# Patient Record
Sex: MALE | Race: WHITE | Employment: OTHER | URBAN - METROPOLITAN AREA
[De-identification: names, ages, dates, MRNs, and addresses within clinical notes are randomized per-mention and may not be internally consistent; named-entity substitution may affect disease eponyms.]

---

## 2017-01-26 ENCOUNTER — HOSPITAL ENCOUNTER (OUTPATIENT)
Dept: LAB | Age: 82
Discharge: HOME | End: 2017-01-26
Payer: MEDICARE

## 2017-01-26 ENCOUNTER — OFFICE VISIT (OUTPATIENT)
Dept: INTERNAL MEDICINE CLINIC | Age: 82
End: 2017-01-26

## 2017-01-26 VITALS
HEIGHT: 70 IN | WEIGHT: 150 LBS | BODY MASS INDEX: 21.47 KG/M2 | OXYGEN SATURATION: 98 % | TEMPERATURE: 97.5 F | DIASTOLIC BLOOD PRESSURE: 64 MMHG | HEART RATE: 83 BPM | SYSTOLIC BLOOD PRESSURE: 138 MMHG | RESPIRATION RATE: 16 BRPM

## 2017-01-26 DIAGNOSIS — R41.3 MEMORY LOSS: Primary | ICD-10-CM

## 2017-01-26 DIAGNOSIS — R41.81 AGE-RELATED COGNITIVE DECLINE: ICD-10-CM

## 2017-01-26 DIAGNOSIS — G47.00 INSOMNIA, UNSPECIFIED TYPE: ICD-10-CM

## 2017-01-26 PROCEDURE — 36415 COLL VENOUS BLD VENIPUNCTURE: CPT

## 2017-01-26 PROCEDURE — 83921 ORGANIC ACID SINGLE QUANT: CPT

## 2017-01-26 PROCEDURE — 82607 VITAMIN B-12: CPT

## 2017-01-26 PROCEDURE — 84443 ASSAY THYROID STIM HORMONE: CPT

## 2017-01-26 RX ORDER — TRAZODONE HYDROCHLORIDE 50 MG/1
50 TABLET ORAL
Qty: 90 TAB | Refills: 1
Start: 2017-01-26 | End: 2020-05-25

## 2017-01-26 RX ORDER — TAMSULOSIN HYDROCHLORIDE 0.4 MG/1
CAPSULE ORAL
Refills: 5 | COMMUNITY
Start: 2016-12-29 | End: 2017-11-08 | Stop reason: ALTCHOICE

## 2017-01-26 RX ORDER — SIMETHICONE 180 MG
1 CAPSULE ORAL
COMMUNITY
End: 2020-02-19 | Stop reason: ALTCHOICE

## 2017-01-26 RX ORDER — RANITIDINE 150 MG/1
150 TABLET, FILM COATED ORAL DAILY
COMMUNITY
End: 2018-02-01

## 2017-01-26 NOTE — MR AVS SNAPSHOT
Visit Information Date & Time Provider Department Dept. Phone Encounter #  
 1/26/2017 10:00 AM Gentry Winchester MD [facility] [phone] [id number] Upcoming Health Maintenance Date Due DTaP/Tdap/Td series (1 - Tdap) 1/2/2011 Pneumococcal 65+ High/Highest Risk (2 of 2 - PPSV23) 1/1/2016 MEDICARE YEARLY EXAM 3/3/2017 GLAUCOMA SCREENING Q2Y 9/1/2017 Allergies as of 1/26/2017  Review Complete On: 1/26/2017 By: Neptali Wei LPN Severity Noted Reaction Type Reactions Statins-hmg-coa Reductase Inhibitors  08/31/2015    Myalgia Current Immunizations  Reviewed on 1/26/2017 Name Date Influenza High Dose Vaccine PF 11/3/2016, 11/19/2015 Pneumococcal Vaccine (Unspecified Type) 1/1/2011 Td 1/1/2011 Zoster Vaccine, Live 1/1/2011 Reviewed by Neptali Wei LPN on 1/06/3030 at 84:80 AM  
 Reviewed by Neptali Wei LPN on 3/75/8191 at 04:98 AM  
You Were Diagnosed With   
  
 Codes Comments Memory loss    -  Primary ICD-10-CM: R41.3 ICD-9-CM: 780.93 Insomnia, unspecified type     ICD-10-CM: G47.00 ICD-9-CM: 780.52 Age-related cognitive decline     ICD-10-CM: R41.81 
ICD-9-CM: 294.9 Vitals BP Pulse Temp Resp Height(growth percentile) Weight(growth percentile)  
 138/64 (BP 1 Location: Left arm, BP Patient Position: Sitting) 83 97.5 °F (36.4 °C) (Oral) 16 5' 10\" (1.778 m) 150 lb (68 kg) SpO2 BMI Smoking Status 98% 21.52 kg/m2 Former Smoker Vitals History BMI and BSA Data Body Mass Index Body Surface Area  
 21.52 kg/m 2 1.83 m 2 Preferred Pharmacy Pharmacy Name Phone [id number] [address] [address] [phone] Your Updated Medication List  
  
   
This list is accurate as of: 1/26/17 10:39 AM.  Always use your most recent med list.  
  
  
  
  
 calcium citrate-vitamin D3 tablet Commonly known as:  CITRACAL WITH VITAMIN D MAXIMUM Take  by mouth two (2) times a day. * lisinopril 10 mg tablet Commonly known as:  Wilson Kelley Take 1 Tab by mouth daily. * lisinopril 10 mg tablet Commonly known as:  PRINIVIL, ZESTRIL  
TAKE 1 TABLET BY MOUTH EVERY DAY  
  
 raNITIdine 150 mg tablet Commonly known as:  ZANTAC Take 150 mg by mouth daily. simethicone 180 mg Cap Take 1 Tab by mouth daily. tamsulosin 0.4 mg capsule Commonly known as:  FLOMAX TAKE ONE CAPSULE BY MOUTH EVERY DAY  
  
 traZODone 50 mg tablet Commonly known as:  Rebbecca Bunde Take 1 Tab by mouth nightly. * Notice: This list has 2 medication(s) that are the same as other medications prescribed for you. Read the directions carefully, and ask your doctor or other care provider to review them with you. We Performed the Following METHYLMALONIC ACID [32281 CPT(R)] TSH 3RD GENERATION [94143 CPT(R)] VITAMIN B12 & FOLATE [81588 CPT(R)] To-Do List   
 01/26/2017 Imaging:  CT HEAD WO CONT Introducing [facility]! Gagan Xie introduces [employer] patient portal. Now you can access parts of your medical record, email your doctor's office, and request medication refills online. 1. In your internet browser, go to [email or web address] 2. Click on the First Time User? Click Here link in the Sign In box. You will see the New Member Sign Up page. 3. Enter your [employer] Access Code exactly as it appears below. You will not need to use this code after youve completed the sign-up process. If you do not sign up before the expiration date, you must request a new code. · [employer] Access Code: [email or web address] Expires: 4/26/2017 10:39 AM 
 
4. Enter the last four digits of your Social Security Number (xxxx) and Date of Birth (mm/dd/yyyy) as indicated and click Submit. You will be taken to the next sign-up page. 5. Create a [employer] ID. This will be your [employer] login ID and cannot be changed, so think of one that is secure and easy to remember. 6. Create a [employer] password. You can change your password at any time. 7. Enter your Password Reset Question and Answer. This can be used at a later time if you forget your password. 8. Enter your e-mail address. You will receive e-mail notification when new information is available in [address] 9. Click Sign Up. You can now view and download portions of your medical record. 10. Click the Download Summary menu link to download a portable copy of your medical information. If you have questions, please visit the Frequently Asked Questions section of the [employer] website. Remember, [employer] is NOT to be used for urgent needs. For medical emergencies, dial 911. Now available from your iPhone and Android! Please provide this summary of care documentation to your next provider. Your primary care clinician is listed as Rex Tavarez. If you have any questions after today's visit, please call [phone].

## 2017-01-26 NOTE — PROGRESS NOTES
Chief Complaint   Patient presents with    Medication Evaluation     Elavil    Memory Loss     Cognitive decline  Subjective:   Ever Farris is a 80 y.o.  right handed male self-referred for evaluation and treatment of cognitive problems. He is accompanied by patient and daughter. Primary caregiver is patient. The family and the patient identify problems with changes in short term memory and getting disoriented outside of familiar environment. Family and patient report problems with no agitation. Family and patient are concerned about  medication errors. Medication administration: patient self medicates      Functional Assessment:   Activities of Daily Living (ADLs):    He is independent in the following: ambulation, bathing and hygiene, feeding, continence, toileting and dressing  Requires assistance with the following: grooming  Cues to do it  Outside reports reviewed: none. Patient Active Problem List    Diagnosis Date Noted    Cognitive decline 09/02/2016    Urethral stricture 03/02/2016    Essential hypertension 03/02/2016    Insomnia 03/02/2016    Statin intolerance 02/11/2016    Prostate CA ([address] 08/31/2015    CAD (coronary artery disease) 08/31/2015    Tinnitus 08/31/2015     Current Outpatient Prescriptions   Medication Sig Dispense Refill    tamsulosin (FLOMAX) 0.4 mg capsule TAKE ONE CAPSULE BY MOUTH EVERY DAY  5    raNITIdine (ZANTAC) 150 mg tablet Take 150 mg by mouth daily.  simethicone 180 mg cap Take 1 Tab by mouth daily.  traZODone (DESYREL) 50 mg tablet Take 1 Tab by mouth nightly. 90 Tab 1    calcium citrate-vitamin D3 (CITRACAL WITH VITAMIN D MAXIMUM) tablet Take  by mouth two (2) times a day.  lisinopril (PRINIVIL, ZESTRIL) 10 mg tablet Take 1 Tab by mouth daily.  90 Tab 1    lisinopril (PRINIVIL, ZESTRIL) 10 mg tablet TAKE 1 TABLET BY MOUTH EVERY DAY 30 Tab 5        Review of Systems  A comprehensive review of systems was negative except for that written in the HPI. Objective:     Visit Vitals    /64 (BP 1 Location: Left arm, BP Patient Position: Sitting)    Pulse 83    Temp 97.5 °F (36.4 °C) (Oral)    Resp 16    Ht 5' 10\" (1.778 m)    Wt 150 lb (68 kg)    SpO2 98%    BMI 21.52 kg/m2     Visit Vitals    /64 (BP 1 Location: Left arm, BP Patient Position: Sitting)    Pulse 83    Temp 97.5 °F (36.4 °C) (Oral)    Resp 16    Ht 5' 10\" (1.778 m)    Wt 150 lb (68 kg)    SpO2 98%    BMI 21.52 kg/m2     General appearance: alert, cooperative, no distress, appears stated age  Neurologic: Alert and oriented X 3, normal strength and tone. Normal symmetric reflexes. Normal coordination and gait    MMSE: was not done time restraints    Imaging      Lab Review       Assessment/Plan:     Dementia, or cognitive declinbe, type etiology unclear  Nola Dominique was seen today for medication evaluation and memory loss. Diagnoses and all orders for this visit:    Memory loss  -     CT HEAD WO CONT; Future  -     VITAMIN B12 & FOLATE  -     METHYLMALONIC ACID  -     TSH 3RD GENERATION    Insomnia, unspecified type  -     traZODone (DESYREL) 50 mg tablet; Take 1 Tab by mouth nightly.     Age-related cognitive decline  -     CT HEAD WO CONT; Future  -     VITAMIN B12 & FOLATE  -     METHYLMALONIC ACID  -     TSH 3RD GENERATION      Counseled patient and fmaily regarding the diagnosis and progression of dementia  Provided written information about dementia and community resources     [id number] ent note meds [id number]

## 2017-01-26 NOTE — LETTER
1/31/2017 2:37 PM 
 
[place]. Natali Cota [address] [facility] [place]: 
 
Please find your most recent results below. Resulted Orders VITAMIN B12 & FOLATE Result Value Ref Range Vitamin B12 >2000 (H) 211 - 946 pg/mL Folate 19.8 >3.0 ng/mL Comment: A serum folate concentration of less than 3.1 ng/mL is 
considered to represent clinical deficiency. Narrative Performed at:  [address]  [id number] : Lyssa Clark MD, Phone:  [phone] METHYLMALONIC ACID Result Value Ref Range METHYLMALONIC ACID, SERUM 163 0 - 378 nmol/L Narrative Performed at:  [address]  [id number] : Lyssa Clark MD, Phone:  [phone] TSH 3RD GENERATION Result Value Ref Range TSH 1.470 0.450 - 4.500 uIU/mL Narrative Performed at:  [address]  [id number] : Lyssa Clark MD, Phone:  [phone] RECOMMENDATIONS: 
I have reviewed all lab results which are normal or stable. Please call me if you have any questions: [phone] Sincerely, Zaida Jaimes MD

## 2017-01-30 LAB
FOLATE SERPL-MCNC: 19.8 NG/ML
METHYLMALONATE SERPL-SCNC: 163 NMOL/L (ref 0–378)
TSH SERPL DL<=0.005 MIU/L-ACNC: 1.47 UIU/ML (ref 0.45–4.5)
VIT B12 SERPL-MCNC: >2000 PG/ML (ref 211–946)

## 2017-01-31 NOTE — PROGRESS NOTES
Letter sent to the address on file to notify the patient per Dr Kenneth Torres that the labs are normal/stable. Asked for a return call to the office with any additional questions.

## 2017-02-02 ENCOUNTER — HOSPITAL ENCOUNTER (OUTPATIENT)
Dept: CT IMAGING | Age: 82
Discharge: HOME | End: 2017-02-02
Attending: INTERNAL MEDICINE
Payer: MEDICARE

## 2017-02-02 DIAGNOSIS — R41.81 AGE-RELATED COGNITIVE DECLINE: ICD-10-CM

## 2017-02-02 DIAGNOSIS — R41.3 MEMORY LOSS: ICD-10-CM

## 2017-02-02 PROCEDURE — 70450 CT HEAD/BRAIN W/O DYE: CPT

## 2017-02-03 NOTE — PROGRESS NOTES
Writer spoke with patient and informed him Per Dr Acacia Henson, nothing acute seen on CT scan possible old stroke seen.  Patient expressed verbal understanding

## 2017-02-13 ENCOUNTER — OFFICE VISIT (OUTPATIENT)
Dept: INTERNAL MEDICINE CLINIC | Age: 82
End: 2017-02-13

## 2017-02-13 VITALS
OXYGEN SATURATION: 98 % | SYSTOLIC BLOOD PRESSURE: 124 MMHG | DIASTOLIC BLOOD PRESSURE: 60 MMHG | WEIGHT: 149 LBS | BODY MASS INDEX: 21.33 KG/M2 | HEIGHT: 70 IN | RESPIRATION RATE: 16 BRPM | HEART RATE: 74 BPM

## 2017-02-13 DIAGNOSIS — R41.89 COGNITIVE DECLINE: Primary | ICD-10-CM

## 2017-02-13 DIAGNOSIS — I69.319 CVA, OLD, COGNITIVE DEFICITS: ICD-10-CM

## 2017-02-13 RX ORDER — EZETIMIBE 10 MG
TABLET ORAL
Refills: 6 | COMMUNITY
Start: 2017-02-03 | End: 2018-02-01

## 2017-02-13 RX ORDER — DONEPEZIL HYDROCHLORIDE 10 MG/1
10 TABLET, FILM COATED ORAL
Qty: 30 TAB | Refills: 5 | Status: SHIPPED | OUTPATIENT
Start: 2017-03-13 | End: 2017-11-08 | Stop reason: ALTCHOICE

## 2017-02-13 RX ORDER — LISINOPRIL 20 MG/1
TABLET ORAL
Refills: 3 | COMMUNITY
Start: 2017-02-03 | End: 2018-02-01

## 2017-02-13 RX ORDER — DONEPEZIL HYDROCHLORIDE 5 MG/1
5 TABLET, FILM COATED ORAL
Qty: 30 TAB | Refills: 0 | Status: SHIPPED | OUTPATIENT
Start: 2017-02-13 | End: 2017-11-08 | Stop reason: ALTCHOICE

## 2017-02-13 NOTE — PROGRESS NOTES
Chief Complaint   Patient presents with    Follow-up     discuss test and labs      Card restarted zetia as intol of statins  I agree    Gassy distension  Chief Complaint   Patient presents with    Follow-up     discuss test and labs      Cognitive decline  Subjective:   Mervin Paz is a 80 y.o.  right handed male self-referred for evaluation and treatment of cognitive problems. He is accompanied by patient and daughter. Primary caregiver is patient. The family and the patient identify problems with changes in short term memory and getting disoriented outside of familiar environment. Family and patient report problems with no agitation. Family and patient are concerned about  medication errors. Medication administration: patient self medicates      Functional Assessment:   Activities of Daily Living (ADLs):    He is independent in the following: ambulation, bathing and hygiene, feeding, continence, toileting and dressing  Requires assistance with the following: grooming  Cues to do it  Outside reports reviewed: none. 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  simethicone 180 mg cap Take 1 Tab by mouth daily.  traZODone (DESYREL) 50 mg tablet Take 1 Tab by mouth nightly.  90 Tab 1    lisinopril (PRINIVIL, ZESTRIL) 10 mg tablet TAKE 1 TABLET BY MOUTH EVERY DAY 30 Tab 5    calcium citrate-vitamin D3 (CITRACAL WITH VITAMIN D MAXIMUM) tablet Take  by mouth two (2) times a day.  lisinopril (PRINIVIL, ZESTRIL) 20 mg tablet TAKE 1 TABLET BY ORAL ROUTE EVERY DAY  3        Review of Systems  A comprehensive review of systems was negative except for that written in the HPI. Gassy bloating and rectal gas  Objective:     Visit Vitals    /60    Pulse 74    Resp 16    Ht 5' 10\" (1.778 m)    Wt 149 lb (67.6 kg)    SpO2 98%    BMI 21.38 kg/m2     Visit Vitals    /60    Pulse 74    Resp 16    Ht 5' 10\" (1.778 m)    Wt 149 lb (67.6 kg)    SpO2 98%    BMI 21.38 kg/m2     General appearance: alert, cooperative, no distress, appears stated age  Neurologic: Alert and oriented X 3, normal strength and tone. Normal symmetric reflexes. Normal coordination and gait    MMSE: was not done time restraints    Imaging  CT Results (most recent):    Results from Hospital Encounter encounter on 02/02/17   CT HEAD WO CONT   Narrative EXAM:  CT HEAD WO CONT    INDICATION:   Abnormal gait (ataxia)    COMPARISON: None. TECHNIQUE: Unenhanced CT of the head was performed using 5 mm images. Brain and  bone windows were generated. CT dose reduction was achieved through use of a  standardized protocol tailored for this examination and automatic exposure  control for dose modulation. FINDINGS:  The ventricles and sulci are normal in size, shape and configuration and  midline. Minimal small vessel ischemic changes are seen in the periventricular  white matter. Focus of chronic ischemia is noted in the right basal ganglia. There is no intracranial hemorrhage, extra-axial collection, mass, mass effect  or midline shift. The basilar cisterns are open. No acute infarct is  identified. The bone windows demonstrate no abnormalities. Mucoperiosteal  thickening is noted in the left maxillary sinus. Vascular calcification is  noted. Impression IMPRESSION: No acute abnormality.  Minimal small vessel ischemic changes. Focus  of chronic ischemia right basal ganglia. Lab Review     Lab Results   Component Value Date/Time    Vitamin B12 >2000 01/26/2017 10:49 AM    Folate 19.8 01/26/2017 10:49 AM       Assessment/Plan:     Dementia, or cognitive declinbe, type etiology unclear  There are no diagnoses linked to this encounter. Counseled patient and fmaily regarding the diagnosis and progression of dementia  Provided written information about dementia and community resources     [id number] ent note meds reviewed    Lab Results   Component Value Date/Time    Vitamin B12 >2000 01/26/2017 10:49 AM    Folate 19.8 01/26/2017 10:49 AM   Darby Jose was seen today for follow-up. Diagnoses and all orders for this visit:    Cognitive decline    CVA, old, cognitive deficits    Other orders  -     donepezil (ARICEPT) 5 mg tablet; Take 1 Tab by mouth nightly. -     donepezil (ARICEPT) 10 mg tablet; Take 1 Tab by mouth nightly. -     pneumococcal 13 tyrone conj dip (PREVNAR-13) 0.5 mL syrg injection; 0.5 mL by IntraMUSCular route once for 1 dose.       Will try aricept titration  Side effects reviewed

## 2017-02-21 ENCOUNTER — TELEPHONE (OUTPATIENT)
Dept: INTERNAL MEDICINE CLINIC | Age: 82
End: 2017-02-21

## 2017-02-21 NOTE — TELEPHONE ENCOUNTER
Derek Nowak eye doctor prescribed Gabapentin 100mg to him and he would like to know if it's okay to refill it.   Please call

## 2017-02-22 NOTE — TELEPHONE ENCOUNTER
Notified the patient per Dr Rudy Lancaster that it is fine to take Gabapentin. He states understanding.

## 2017-02-27 RX ORDER — LISINOPRIL 10 MG/1
TABLET ORAL
Qty: 30 TAB | Refills: 10 | Status: SHIPPED | OUTPATIENT
Start: 2017-02-27 | End: 2017-11-08 | Stop reason: SDUPTHER

## 2017-03-05 RX ORDER — TRAZODONE HYDROCHLORIDE 50 MG/1
TABLET ORAL
Qty: 90 TAB | Refills: 1 | Status: SHIPPED | OUTPATIENT
Start: 2017-03-05 | End: 2017-11-08 | Stop reason: SDUPTHER

## 2017-03-15 RX ORDER — DONEPEZIL HYDROCHLORIDE 5 MG/1
TABLET, FILM COATED ORAL
Qty: 30 TAB | Refills: 0 | OUTPATIENT
Start: 2017-03-15

## 2017-03-15 NOTE — TELEPHONE ENCOUNTER
Spoke with the pharmacist and she states understanding to fill the Aricept 10 mg that they have on fill.

## 2017-03-27 RX ORDER — GUAIFENESIN 100 MG/5ML
81 LIQUID (ML) ORAL DAILY
Qty: 30 TAB | Status: SHIPPED | OUTPATIENT
Start: 2017-03-27 | End: 2018-02-01

## 2017-07-21 ENCOUNTER — TELEPHONE (OUTPATIENT)
Dept: INTERNAL MEDICINE CLINIC | Age: 82
End: 2017-07-21

## 2017-07-21 ENCOUNTER — OFFICE VISIT (OUTPATIENT)
Dept: INTERNAL MEDICINE CLINIC | Age: 82
End: 2017-07-21

## 2017-07-21 VITALS
DIASTOLIC BLOOD PRESSURE: 63 MMHG | SYSTOLIC BLOOD PRESSURE: 130 MMHG | RESPIRATION RATE: 15 BRPM | BODY MASS INDEX: 20.62 KG/M2 | HEART RATE: 67 BPM | OXYGEN SATURATION: 98 % | HEIGHT: 70 IN | WEIGHT: 144 LBS | TEMPERATURE: 97.9 F

## 2017-07-21 DIAGNOSIS — J02.9 ACUTE PHARYNGITIS, UNSPECIFIED ETIOLOGY: Primary | ICD-10-CM

## 2017-07-21 DIAGNOSIS — Z00.00 ROUTINE GENERAL MEDICAL EXAMINATION AT A HEALTH CARE FACILITY: ICD-10-CM

## 2017-07-21 PROBLEM — Z71.89 ACP (ADVANCE CARE PLANNING): Status: ACTIVE | Noted: 2017-07-21

## 2017-07-21 NOTE — PROGRESS NOTES
Chief Complaint   Patient presents with    Sore Throat    Ear Pain     Seeing the eye doctor for conjunctivitis  One day sore throat radiating to ear feels better carola  Chronic post nasal drip  No cough or fever      Vitals:    07/21/17 0825   BP: 130/63   Pulse: 67   Resp: 15   Temp: 97.9 °F (36.6 °C)   TempSrc: Oral   SpO2: 98%   Weight: 144 lb (65.3 kg)   Height: 5' 10\" (1.778 m)     Anxious  . Throat exam normal. Oral cavity, tongue, pharynx and palate have no inflammation or suspicious lesions. Tonsils - tonsils are present and normal. Teeth normal without tenderness. Ears ok  Chest clear    Aishwarya Troy was seen today for sore throat and ear pain. Diagnoses and all orders for this visit:    Acute pharyngitis, unspecified etiology      The patient is reassured that these symptoms do not appear to represent a serious or threatening condition.       Gargle and lozenge advised    Wellness exam by nursing reviewed and agree with plans  discussed with nurse

## 2017-07-21 NOTE — MR AVS SNAPSHOT
Visit Information Date & Time Provider Department Dept. Phone Encounter #  
 7/21/2017  8:30 AM Rene Wen MD On license of [facility] [phone] [id number] Upcoming Health Maintenance Date Due DTaP/Tdap/Td series (1 - Tdap) 1/2/2011 Pneumococcal 65+ High/Highest Risk (2 of 2 - PPSV23) 1/1/2016 MEDICARE YEARLY EXAM 3/3/2017 INFLUENZA AGE 9 TO ADULT 8/1/2017 GLAUCOMA SCREENING Q2Y 2/16/2019 Allergies as of 7/21/2017  Review Complete On: 7/21/2017 By: Rachel Arndt LPN Severity Noted Reaction Type Reactions Statins-hmg-coa Reductase Inhibitors  08/31/2015    Myalgia Current Immunizations  Reviewed on 1/26/2017 Name Date Influenza High Dose Vaccine PF 11/3/2016, 11/19/2015 Pneumococcal Vaccine (Unspecified Type) 1/1/2011 Td 1/1/2011 Zoster Vaccine, Live 1/1/2011 Not reviewed this visit Vitals BP Pulse Temp Resp Height(growth percentile) Weight(growth percentile) 130/63 (BP 1 Location: Left arm, BP Patient Position: Sitting) 67 97.9 °F (36.6 °C) (Oral) 15 5' 10\" (1.778 m) 144 lb (65.3 kg) SpO2 BMI Smoking Status 98% 20.66 kg/m2 Former Smoker Vitals History BMI and BSA Data Body Mass Index Body Surface Area  
 20.66 kg/m 2 1.8 m 2 Preferred Pharmacy Pharmacy Name Phone [id number] [phone] Your Updated Medication List  
  
   
This list is accurate as of: 7/21/17  8:55 AM.  Always use your most recent med list.  
  
  
  
  
 aspirin 81 mg chewable tablet Take 1 Tab by mouth daily. calcium citrate-vitamin D3 tablet Commonly known as:  CITRACAL WITH VITAMIN D MAXIMUM Take  by mouth two (2) times a day. * donepezil 5 mg tablet Commonly known as:  ARICEPT Take 1 Tab by mouth nightly. * donepezil 10 mg tablet Commonly known as:  ARICEPT  
 Take 1 Tab by mouth nightly. * lisinopril 10 mg tablet Commonly known as:  PRINIVIL, ZESTRIL  
TAKE 1 TABLET BY MOUTH EVERY DAY  
  
 * lisinopril 20 mg tablet Commonly known as:  PRINIVIL, ZESTRIL  
TAKE 1 TABLET BY ORAL ROUTE EVERY DAY  
  
 * lisinopril 10 mg tablet Commonly known as:  PRINIVIL, ZESTRIL  
TAKE 1 TABLET BY MOUTH EVERY DAY  
  
 raNITIdine 150 mg tablet Commonly known as:  ZANTAC Take 150 mg by mouth daily. simethicone 180 mg Cap Take 1 Tab by mouth daily. tamsulosin 0.4 mg capsule Commonly known as:  FLOMAX TAKE ONE CAPSULE BY MOUTH EVERY DAY  
  
 * traZODone 50 mg tablet Commonly known as:  Marianna Deeds Take 1 Tab by mouth nightly. * traZODone 50 mg tablet Commonly known as:  DESYREL  
TAKE 1 TAB BY MOUTH NIGHTLY. ZETIA 10 mg tablet Generic drug:  ezetimibe TAKE 1 TABLET BY ORAL ROUTE EVERY DAY  
  
 * Notice: This list has 7 medication(s) that are the same as other medications prescribed for you. Read the directions carefully, and ask your doctor or other care provider to review them with you. Patient Instructions Medicare Part B Preventive Services Limitations Recommendation Scheduled Glaucoma Screening  Covered for patients with diagnosis of diabetes or family history of glaucoma; -Americans age 48 and older; -Americans age 72 and older Completed 2/16/2017 Recommended every 1-2 years Due 
2/2018 Colorectal Cancer Screening 
 
-Fecal occult blood test every year OR 
-Flexible sigmoidoscopy every 5 yrs OR 
-Colonoscopy every 10 years OR 
-Barium Enema Age 54-65; After age 76 if history of abnormal results Colonoscopy Recommended every 10 years  Complete Let your provider know if you have any concerns Prostate Cancer Screening - Digital rectal exam (TAMMY) OR 
- Prostate specific antigen (PSA) Age 53-78 Completed  History prostate cancer with prostatectomy, follow up with urology as directed Cardiovascular Screening Blood Tests  
(Cholesterol panel) Annually if on cholesterol medication Completed 3/2/2016 Recommended every 5 years Due Diabetes Screening Tests -Basic Metabolic Panel (BMP) or Hemoglobin A1C (HgbA1C) BMP every 3 years for non-diabetic patients HgbA1C every 3-6 months for diabetic patients Completed 9/22/2016 Lab- CMP done Due Seasonal Influenza Vaccination  Completed 11/3/2016 Recommended Annually Due Fall 2017 Prevnar Vaccine (PCV 13) Age 72 and older, protects against more types of Pneumonia than the Pneumococcal Vaccine alone Completed  
 
once Completed Pneumococcal Vaccination  
(PPSV 23) Age 72 and older Completed 2011 Recommended twice after age 72, space vaccines 5 years apart  Completed Tetanus Vaccine All Ages 
 
-Only covered by Medicare Part D through the pharmacy -Requires a prescription from your primary care provider Completed 1/1/2011 Recommended every 10 years  Due  
 
1/1/2021 Zoster Vaccine (Shingles) Age 61 and older 
 
-Only covered by Medicare Part D through the pharmacy Completed 1/1/2011 Recommended once  Completed Family Practice Management 2011 Advanced Medical Directive/Living Will If you have completed an Advanced Medical Directive or a Living Will, please bring a copy to the office at your convenience to be scanned into your record. Introducing [facility]! [employer] introduces [employer] patient portal. Now you can access parts of your medical record, email your doctor's office, and request medication refills online. 1. In your internet browser, go to [email or web address] 2. Click on the First Time User? Click Here link in the Sign In box. You will see the New Member Sign Up page. 3. Enter your [employer] Access Code exactly as it appears below. You will not need to use this code after youve completed the sign-up process.  If you do not sign up before the expiration date, you must request a new code. · [employer] Access Code: [email or web address] Expires: 10/19/2017  8:55 AM 
 
4. Enter the last four digits of your Social Security Number (xxxx) and Date of Birth (mm/dd/yyyy) as indicated and click Submit. You will be taken to the next sign-up page. 5. Create a [employer] ID. This will be your [employer] login ID and cannot be changed, so think of one that is secure and easy to remember. 6. Create a [employer] password. You can change your password at any time. 7. Enter your Password Reset Question and Answer. This can be used at a later time if you forget your password. 8. Enter your e-mail address. You will receive e-mail notification when new information is available in [address] 9. Click Sign Up. You can now view and download portions of your medical record. 10. Click the Download Summary menu link to download a portable copy of your medical information. If you have questions, please visit the Frequently Asked Questions section of the [employer] website. Remember, [employer] is NOT to be used for urgent needs. For medical emergencies, dial 911. Now available from your iPhone and Android! Please provide this summary of care documentation to your next provider. Your primary care clinician is listed as Ben Darby. If you have any questions after today's visit, please call [phone].

## 2017-07-21 NOTE — PROGRESS NOTES
Coordination of Care Questions    1. Have you been to the ER, urgent care clinic since your last visit? No       Hospitalized since your last visit? No    2. Have you seen or consulted any other health care providers outside of the [address] [place] [address] since your last visit? Include any pap smears or colon screening.  Seen by Dr Joshua Burger for eye infection and the infection \"has moved\", using Tobradex

## 2017-07-21 NOTE — PATIENT INSTRUCTIONS
Medicare Part B Preventive Services Limitations Recommendation Scheduled   Glaucoma Screening  Covered for patients with diagnosis of diabetes or family history of glaucoma; -Americans age 48 and older; -Americans age 72 and older Completed  2/16/2017  Recommended every 1-2 years Due  2/2018   Colorectal Cancer Screening    -Fecal occult blood test every year OR  -Flexible sigmoidoscopy every 5 yrs OR  -Colonoscopy every 10 years OR  -Barium Enema Age 54-65;  After age 76 if history of abnormal results   Colonoscopy Recommended every 10 years  Complete     Let your provider know if you have any concerns     Prostate Cancer Screening     - Digital rectal exam (TAMMY) OR  - Prostate specific antigen (PSA)          Age 53-78 Completed   History prostate cancer with prostatectomy, follow up with urology as directed    Cardiovascular Screening Blood Tests   (Cholesterol panel) Annually if on cholesterol medication Completed   3/2/2016    Recommended every 5 years Due    Diabetes Screening Tests    -Basic Metabolic Panel (BMP) or Hemoglobin A1C (HgbA1C)   BMP every 3 years for non-diabetic patients    HgbA1C every 3-6 months for diabetic patients     Completed   9/22/2016    Lab- CMP done Due    Seasonal Influenza Vaccination  Completed   11/3/2016  Recommended Annually Due Fall 2017   Prevnar Vaccine  (PCV 13)         Age 72 and older, protects against more types of Pneumonia than the Pneumococcal Vaccine alone Completed     once Completed    Pneumococcal Vaccination   (PPSV 23) Age 72 and older     Completed   2011  Recommended twice after age 72, space vaccines 5 years apart  Completed     Tetanus Vaccine All Ages    -Only covered by Medicare Part D through the pharmacy    -Requires a prescription from your primary care provider   Completed   1/1/2011  Recommended every 10 years  Due     1/1/2021   Zoster Vaccine (Shingles) Age 61 and older    -Only covered by Medicare Part D through the pharmacy Completed   1/1/2011      Recommended once  Completed    Family Practice Management 2011    Advanced Medical Directive/Living Will  If you have completed an Advanced Medical Directive or a Living Will, please bring a copy to the office at your convenience to be scanned into your record.

## 2017-07-21 NOTE — TELEPHONE ENCOUNTER
Per conversation with  during AWV this am.  I contacted [facility] at [facility] to verify he has had his prevnar vaccine. [facility] pharmacy reports no record of patient getting his Prevnar 13 vaccine at their pharmacy. Attempted to contact patient and relay this info, [facility] for him to return call.

## 2017-07-21 NOTE — PROGRESS NOTES
This is a Subsequent Medicare Annual Wellness Visit providing Personalized Prevention Plan Services (PPPS) (Performed 12 months after initial AWV and PPPS )    I have reviewed the patient's medical history in detail and updated the computerized patient record. History     Past Medical History:   Diagnosis Date    Hypertension     Tinnitus       Past Surgical History:   Procedure Laterality Date    HX LITHOTRIPSY      HX PROSTATECTOMY  ~2012    prostatectomy    HX UROLOGICAL  2016    patient not sure of name of procedure, completed by Dr. White Rather  2010    prostatectomy     Current Outpatient Prescriptions   Medication Sig Dispense Refill    tamsulosin (FLOMAX) 0.4 mg capsule TAKE ONE CAPSULE BY MOUTH EVERY DAY  5    raNITIdine (ZANTAC) 150 mg tablet Take 150 mg by mouth daily.  traZODone (DESYREL) 50 mg tablet Take 1 Tab by mouth nightly. 90 Tab 1    lisinopril (PRINIVIL, ZESTRIL) 10 mg tablet TAKE 1 TABLET BY MOUTH EVERY DAY 30 Tab 5    aspirin 81 mg chewable tablet Take 1 Tab by mouth daily. 30 Tab prn    traZODone (DESYREL) 50 mg tablet TAKE 1 TAB BY MOUTH NIGHTLY. 90 Tab 1    lisinopril (PRINIVIL, ZESTRIL) 10 mg tablet TAKE 1 TABLET BY MOUTH EVERY DAY 30 Tab 10    ZETIA 10 mg tablet TAKE 1 TABLET BY ORAL ROUTE EVERY DAY  6    lisinopril (PRINIVIL, ZESTRIL) 20 mg tablet TAKE 1 TABLET BY ORAL ROUTE EVERY DAY  3    donepezil (ARICEPT) 5 mg tablet Take 1 Tab by mouth nightly. 30 Tab 0    donepezil (ARICEPT) 10 mg tablet Take 1 Tab by mouth nightly. 30 Tab 5    simethicone 180 mg cap Take 1 Tab by mouth daily.  calcium citrate-vitamin D3 (CITRACAL WITH VITAMIN D MAXIMUM) tablet Take  by mouth two (2) times a day.        Allergies   Allergen Reactions    Statins-Hmg-Coa Reductase Inhibitors Myalgia     Family History   Problem Relation Age of Onset    No Known Problems Mother     Heart Disease Father     Lung Disease Father      Social History   Substance Use Topics  Smoking status: Former Smoker     Years: 5.00    Smokeless tobacco: Never Used    Alcohol use 0.6 oz/week     1 Glasses of wine per week      Comment: nightly     Patient Active Problem List   Diagnosis Code    Prostate CA ([facility] [address] C61    CAD (coronary artery disease) I25.10    Tinnitus H93.19    Statin intolerance Z78.9    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 Requires assistance with: no ADLs    Fall Risk     Fall Risk Assessment, last 12 mths 7/21/2017   Able to walk? Yes   Fall in past 12 months? No     Abuse Screen   Patient is not abused    Review of Systems   Not required  annual wellness visit    Physical Examination     Evaluation of Cognitive Function:  Mood/affect:  happy  Appearance: age appropriate, well dressed and within normal Limits  Family member/caregiver input: wife present    No exam performed today, annual wellness vist.    Patient Care Team:  Rene Wen MD as PCP - General (Internal Medicine)  Bronwyn Haines MD (Urology)  Damien Cook MD (Cardiology)  Brett Arciniega MD (Otolaryngology)  Ravi Ivey MD (Ophthalmology)    Advice/Referrals/Counseling   Education and counseling provided:  Are appropriate based on today's review and evaluation  End-of-Life planning (with patient's consent)  Pneumococcal Vaccine  Cardiovascular screening blood test  Screening for glaucoma      Assessment/Plan       ICD-10-CM ICD-9-CM    1.  Acute pharyngitis, unspecified etiology J02.9 462      An After Visit Summary was printed and given to the patient. 1. All age appropriate screenings and immunizations are discussed with patient. Patient up to date on all screenings. He is not sure of his immunization history, but get vaccines at [facility]. Will attempt to contact his pharmacy to up date our records. 2.  Patient has an AMD and is advised that if he brings a copy into our office it can be scanned into his EMR. 3.   stays active by walking 3 times a week or more for 20 minutes at a time. [employer]

## 2017-11-08 ENCOUNTER — OFFICE VISIT (OUTPATIENT)
Dept: INTERNAL MEDICINE CLINIC | Age: 82
End: 2017-11-08

## 2017-11-08 VITALS
HEIGHT: 70 IN | SYSTOLIC BLOOD PRESSURE: 132 MMHG | BODY MASS INDEX: 20.9 KG/M2 | HEART RATE: 68 BPM | WEIGHT: 146 LBS | DIASTOLIC BLOOD PRESSURE: 74 MMHG | RESPIRATION RATE: 16 BRPM | OXYGEN SATURATION: 98 %

## 2017-11-08 DIAGNOSIS — M54.50 BILATERAL LOW BACK PAIN WITHOUT SCIATICA, UNSPECIFIED CHRONICITY: Primary | ICD-10-CM

## 2017-11-08 NOTE — PROGRESS NOTES
SUBJECTIVE:   Brooke Schilling is a 80 y.o. male who complains of low back pain for 3 month(s), positional with bending or lifting, without radiation down the legs. Precipitating factors: none recalled by the patient. Prior history of back problems: previous osteoarthritis of lumbar spine and previous spinal surgery - had injection 2012. There is no numbness in the legs. Vitals:    11/08/17 1630 11/08/17 1647   BP: 157/72 132/74   Pulse: 68    Resp: 16    SpO2: 98%    Weight: 146 lb (66.2 kg)    Height: 5' 10\" (1.778 m)        OBJECTIVE:  Visit Vitals    /72    Pulse 68    Resp 16    Ht 5' 10\" (1.778 m)    Wt 146 lb (66.2 kg)    SpO2 98%    BMI 20.95 kg/m2      Patient appears to be in mild to moderate pain, antalgic gait noted. Lumbosacral spine area reveals no local tenderness or mass. Painful and reduced LS ROM noted. Straight leg raise is negative at 45 degrees on bilateral. , motor strength and sensation normal, including heel and toe gait. Peripheral pulses are palpable. X-Ray: shows DJD changes, likely chronic. MRI Results (most recent):        ASSESSMENT:   lumbar strain and degenerative disc disease without herniated disc    PLAN:  For acute pain, rest, intermittent application of heat (do not sleep on heating pad), analgesics and muscle relaxants are recommended. Discussed longer term treatment plan of prn NSAID's and discussed a home back care exercise program with flexion exercise routine. Proper lifting with avoidance of heavy lifting discussed. Consider Physical Therapy and XRay studies if not improving. Call or return to clinic prn if these symptoms worsen or fail to improve as anticipated. The patient was advised that NSAID-type medications have two very important potential side effects: gastrointestinal irritation including hemorrhage and renal injuries. He was asked to take the medication with food and to stop if he experiences any GI upset.  I asked him to call for vomiting, abdominal pain or black/bloody stools. The patient expresses understanding of these issues and questions were answered. See PT    Diagnoses and all orders for this visit:    1.  Bilateral low back pain without sciatica, unspecified chronicity  -     REFERRAL TO PHYSICAL THERAPY

## 2018-01-02 RX ORDER — TRAZODONE HYDROCHLORIDE 50 MG/1
TABLET ORAL
Qty: 90 TAB | Refills: 1 | Status: SHIPPED | OUTPATIENT
Start: 2018-01-02 | End: 2018-02-01 | Stop reason: SDUPTHER

## 2018-02-01 ENCOUNTER — PATIENT OUTREACH (OUTPATIENT)
Dept: INTERNAL MEDICINE CLINIC | Age: 83
End: 2018-02-01

## 2018-02-01 RX ORDER — SOTALOL HYDROCHLORIDE 80 MG/1
40 TABLET ORAL 2 TIMES DAILY
COMMUNITY

## 2018-02-01 RX ORDER — TAMSULOSIN HYDROCHLORIDE 0.4 MG/1
0.4 CAPSULE ORAL DAILY
COMMUNITY
End: 2018-04-09 | Stop reason: SDUPTHER

## 2018-02-01 NOTE — PROGRESS NOTES
Hospital Discharge Follow-Up      Date/Time:  2018 3:58 PM  Nurse Navigator attempted to contact the patient for MARI follow up. Unable to reach. [facility] requesting a return call. 4:15 PM  Patient's wife Thiago Smallwood, listed on HIPAA, contacted this Nurse Navigator by telephone to perform post hospital discharge assessment. Verified name and  with Thiago Smallwood as identifiers. Provided introduction to self, and explanation of the Nurse Navigator role. Patient was admitted to [facility] on  and discharged on  for a syncopal episode, most likely related to A Fib/Flutter. Presented with c/o generalized weakness, bloating, abdominal complaints, acid reflux, episode of rectal bleed, & syncopal event at [employer]. The physician discharge summary was available at the time of outreach. Patient contacted within 1 business day of discharge. Top challenges reviewed with the provider:  None identified at this time    Method of communication with provider :will discuss patient with Dr. Kristi Hurst just prior to appointment on        Thiago Smallwood given an opportunity to ask questions and does not have any further questions or concerns at this time. Thiago Smallwood agrees to contact the PCP office for questions related to their healthcare. NN provided contact information for future reference. Summary of patients top problems:  1. Atrial Fibrillation: syncopal event most likely due to arrhythmia, started on sotalol and Eliquis, follow up with cardiologist Dr. Colby Sanchez in 2 weeks for an event monitor, troponins negative. Event monitor will be delivered to the patient. Dr. Colby Sanchez' nurse will contact him to schedule appointment. Patient is feeling very well today and was able to get out of the house. He does have increased fatigue, which is wife states may be r/t the beta blocker.   2. Blood Pressure Control: BP on the low side and patient was dehydrated, holding lisinopril for now, keep BP log and call cardiology if SBP >130, BP today 127/65, 60  3. CT abdomen + for non-obstructing kidney stones, stool occult negative    Home Health orders at discharge: none, patient cleared by therapy and patient/wife did not feel home health was necessary  [address]: n/a  Date of initial visit: [facility] ordered/company: n/a  Durable Medical Equipment received: n/a    Barriers to care? none identified at this time    Medication:   Medication reconciliation was performed with Chapis Benjamin, who verbalizes understanding of administration of home medications. There were no barriers to obtaining medications identified at this time. New medications at discharge include Eliquis & sotalol  Does the patient have new prescription(s) to last until follow up with prescribing provider: yes, patient used 30 day free trial coupon for Eliquis, co-pay will be $44 per month and his wife reports they can afford this and agreed to notify Dr. Rohit Morton if cost becomes unaffordable  Prescription Medication total: 4 (pharmacy consult for polypharm needed?) no   Medication changes (dose adjustments or discontinued meds): discontinue lisinopril for now  Medications Discontinued During This Encounter   Medication Reason    traZODone (DESYREL) 50 mg tablet Duplicate Order    lisinopril (PRINIVIL, ZESTRIL) 10 mg tablet Discontinued at Discharge    lisinopril (PRINIVIL, ZESTRIL) 20 mg tablet Discontinued at Discharge    raNITIdine (ZANTAC) 150 mg tablet Not A Current Medication    aspirin 81 mg chewable tablet Not A Current Medication    ZETIA 10 mg tablet Not A Current Medication         Advance Care Planning:   Does patient have an Advance Directive:  did not assess, plan to follow up with patient and wife at appointment     PCP/Specialist follow up: Dr. Margarette Crabtree Monday 2/5 @ 3:45 pm. Call placed to Dr. Rohit Morton' office, his nurse will call back with an appointment, awaiting event monitor to be delivered.       Goals Addressed Most Recent     Patient will monitor BP and maintain control (pt-stated)   On track (2/1/2018)             2/1/18: Patient's wife is checking his BP and keeping a log. Today's reading 127/65, 60. Reviewed parameters for when to call Dr. Jesse Fournier and she was aware to call for BP >130. She will check BP BID for a few days and monitor HR, she will hold sotalol & call Dr. Jesse Fournier if HR <60. After a few days, she will check BP once daily at varying times.   -SRW

## 2018-02-05 ENCOUNTER — OFFICE VISIT (OUTPATIENT)
Dept: INTERNAL MEDICINE CLINIC | Age: 83
End: 2018-02-05

## 2018-02-05 ENCOUNTER — HOSPITAL ENCOUNTER (OUTPATIENT)
Dept: LAB | Age: 83
Discharge: HOME | End: 2018-02-05
Payer: MEDICARE

## 2018-02-05 VITALS
HEART RATE: 73 BPM | WEIGHT: 148 LBS | SYSTOLIC BLOOD PRESSURE: 118 MMHG | BODY MASS INDEX: 21.19 KG/M2 | OXYGEN SATURATION: 98 % | RESPIRATION RATE: 16 BRPM | HEIGHT: 70 IN | DIASTOLIC BLOOD PRESSURE: 66 MMHG

## 2018-02-05 DIAGNOSIS — R55 SYNCOPE, CARDIOGENIC: Primary | ICD-10-CM

## 2018-02-05 DIAGNOSIS — I95.1 ORTHOSTASIS: ICD-10-CM

## 2018-02-05 DIAGNOSIS — L65.9 HAIR THINNING: ICD-10-CM

## 2018-02-05 PROCEDURE — 80048 BASIC METABOLIC PNL TOTAL CA: CPT

## 2018-02-05 PROCEDURE — 36415 COLL VENOUS BLD VENIPUNCTURE: CPT

## 2018-02-05 PROCEDURE — 84443 ASSAY THYROID STIM HORMONE: CPT

## 2018-02-05 PROCEDURE — 85025 COMPLETE CBC W/AUTO DIFF WBC: CPT

## 2018-02-05 NOTE — PROGRESS NOTES
Chief Complaint   Patient presents with   [facility] Follow Up     Mr. Nic mSith is a 80y.o. year old male, he is seen today for Transition of Care services following a hospital discharge for syncope on 1/31. Our office Nurse Navigator performed an outreach to Mr. Sade Patel on 2/1/18 (within 2 business days of discharge) to complete medication reconciliation and a telephonic assessment of his condition. Patient was admitted to [facility] on 1/29 and discharged on 1/31 for a syncopal episode, most likely related to A Fib/Flutter. Presented with c/o generalized weakness, bloating, abdominal complaints, acid reflux, episode of rectal bleed, & syncopal event at [employer]. The physician discharge summary was available at the time of outreach. Patient contacted within 1 business day of discharge.       He has many complaints including gassiness, urinary frequency hair thinning feels cold all the time    Home BP sitting 905 to 116 systolic      Vitals:    91/00/93 1544 02/05/18 1554 02/05/18 1601   BP: 140/72 132/70 118/66  Comment: standing   Pulse: 70 73    Resp: 16     SpO2: 98%     Weight: 148 lb (67.1 kg)     Height: 5' 10\" (1.778 m)       Reg rhythm  Neuro normal      1. Syncope, cardiogenic  Has monitor to follow up DR marin  - CBC WITH AUTOMATED DIFF  - METABOLIC PANEL, BASIC  - TSH 3RD GENERATION    2. Hair thinning  Not likelyan issue      - TSH 3RD GENERATION    Maintain hydration by drinking large amounts of fluids frequently, then soft diet,     Diagnoses and all orders for this visit:    1. Syncope, cardiogenic  -     CBC WITH AUTOMATED DIFF  -     METABOLIC PANEL, BASIC  -     TSH 3RD GENERATION    2. Hair thinning  -     TSH 3RD GENERATION    3.  Orthostasis

## 2018-02-05 NOTE — PROGRESS NOTES
Coordination of Care Questions    1. Have you been to the ER, urgent care clinic since your last visit? No       Hospitalized since your last visit? No    2. Have you seen or consulted any other health care providers outside of the [address] [place] [address] since your last visit? Include any pap smears or colon screening.  No

## 2018-02-05 NOTE — LETTER
2/7/2018 11:16 AM 
 
Mr. Celeste Chapman [address] [facility] Dear Celeste Chapman: 
 
Please find your most recent results below. Resulted Orders CBC WITH AUTOMATED DIFF Result Value Ref Range WBC 7.4 3.4 - 10.8 x10E3/uL  
 RBC 4.10 (L) 4.14 - 5.80 x10E6/uL HGB 13.3 13.0 - 17.7 g/dL HCT 39.0 37.5 - 51.0 % MCV 95 79 - 97 fL  
 MCH 32.4 26.6 - 33.0 pg  
 MCHC 34.1 31.5 - 35.7 g/dL  
 RDW 14.0 12.3 - 15.4 % PLATELET 239 (H) 210 - 379 x10E3/uL NEUTROPHILS 69 Not Estab. % Lymphocytes 19 Not Estab. % MONOCYTES 8 Not Estab. % EOSINOPHILS 2 Not Estab. % BASOPHILS 1 Not Estab. %  
 ABS. NEUTROPHILS 5.2 1.4 - 7.0 x10E3/uL Abs Lymphocytes 1.4 0.7 - 3.1 x10E3/uL  
 ABS. MONOCYTES 0.6 0.1 - 0.9 x10E3/uL  
 ABS. EOSINOPHILS 0.2 0.0 - 0.4 x10E3/uL  
 ABS. BASOPHILS 0.1 0.0 - 0.2 x10E3/uL IMMATURE GRANULOCYTES 1 Not Estab. %  
 ABS. IMM. GRANS. 0.0 0.0 - 0.1 x10E3/uL Narrative Performed at:  [address]  [id number] : Dodie Victor MD, Phone:  [phone] METABOLIC PANEL, BASIC Result Value Ref Range Glucose 108 (H) 65 - 99 mg/dL BUN 16 8 - 27 mg/dL Creatinine 0.83 0.76 - 1.27 mg/dL GFR est non-AA 81 >59 mL/min/1.73 GFR est AA 93 >59 mL/min/1.73  
 BUN/Creatinine ratio 19 10 - 24 Sodium 144 134 - 144 mmol/L Potassium 4.7 3.5 - 5.2 mmol/L Chloride 101 96 - 106 mmol/L  
 CO2 21 18 - 29 mmol/L Calcium 9.4 8.6 - 10.2 mg/dL Narrative Performed at:  [address]  [id number] : Dodie Victor MD, Phone:  [phone] TSH 3RD GENERATION Result Value Ref Range TSH 3.490 0.450 - 4.500 uIU/mL Narrative Performed at:  [address]  [id number] : Dodie Victor MD, Phone:  [phone] RECOMMENDATIONS: 
 
   I have reviewed all lab results which are normal or stable Please call me if you have any questions: [phone] Sincerely, Cj Chávez MD

## 2018-02-06 LAB
BASOPHILS # BLD AUTO: 0.1 X10E3/UL (ref 0–0.2)
BASOPHILS NFR BLD AUTO: 1 %
BUN SERPL-MCNC: 16 MG/DL (ref 8–27)
BUN/CREAT SERPL: 19 (ref 10–24)
CALCIUM SERPL-MCNC: 9.4 MG/DL (ref 8.6–10.2)
CHLORIDE SERPL-SCNC: 101 MMOL/L (ref 96–106)
CO2 SERPL-SCNC: 21 MMOL/L (ref 18–29)
CREAT SERPL-MCNC: 0.83 MG/DL (ref 0.76–1.27)
EOSINOPHIL # BLD AUTO: 0.2 X10E3/UL (ref 0–0.4)
EOSINOPHIL NFR BLD AUTO: 2 %
ERYTHROCYTE [DISTWIDTH] IN BLOOD BY AUTOMATED COUNT: 14 % (ref 12.3–15.4)
GFR SERPLBLD CREATININE-BSD FMLA CKD-EPI: 81 ML/MIN/1.73
GFR SERPLBLD CREATININE-BSD FMLA CKD-EPI: 93 ML/MIN/1.73
GLUCOSE SERPL-MCNC: 108 MG/DL (ref 65–99)
HCT VFR BLD AUTO: 39 % (ref 37.5–51)
HGB BLD-MCNC: 13.3 G/DL (ref 13–17.7)
IMM GRANULOCYTES # BLD: 0 X10E3/UL (ref 0–0.1)
IMM GRANULOCYTES NFR BLD: 1 %
LYMPHOCYTES # BLD AUTO: 1.4 X10E3/UL (ref 0.7–3.1)
LYMPHOCYTES NFR BLD AUTO: 19 %
MCH RBC QN AUTO: 32.4 PG (ref 26.6–33)
MCHC RBC AUTO-ENTMCNC: 34.1 G/DL (ref 31.5–35.7)
MCV RBC AUTO: 95 FL (ref 79–97)
MONOCYTES # BLD AUTO: 0.6 X10E3/UL (ref 0.1–0.9)
MONOCYTES NFR BLD AUTO: 8 %
NEUTROPHILS # BLD AUTO: 5.2 X10E3/UL (ref 1.4–7)
NEUTROPHILS NFR BLD AUTO: 69 %
PLATELET # BLD AUTO: 395 X10E3/UL (ref 150–379)
POTASSIUM SERPL-SCNC: 4.7 MMOL/L (ref 3.5–5.2)
RBC # BLD AUTO: 4.1 X10E6/UL (ref 4.14–5.8)
SODIUM SERPL-SCNC: 144 MMOL/L (ref 134–144)
TSH SERPL DL<=0.005 MIU/L-ACNC: 3.49 UIU/ML (ref 0.45–4.5)
WBC # BLD AUTO: 7.4 X10E3/UL (ref 3.4–10.8)

## 2018-02-08 ENCOUNTER — TELEPHONE (OUTPATIENT)
Dept: INTERNAL MEDICINE CLINIC | Age: 83
End: 2018-02-08

## 2018-02-08 NOTE — TELEPHONE ENCOUNTER
Notified Jim Kenney per Dr Kvng Rutherford that the labs are normal and stable. In addition, a letter was sent to the address on file. Advised her to be on the look out for it. She is pleased with the results.

## 2018-02-12 ENCOUNTER — OFFICE VISIT (OUTPATIENT)
Dept: INTERNAL MEDICINE CLINIC | Age: 83
End: 2018-02-12

## 2018-02-12 VITALS
RESPIRATION RATE: 16 BRPM | HEART RATE: 59 BPM | SYSTOLIC BLOOD PRESSURE: 135 MMHG | DIASTOLIC BLOOD PRESSURE: 88 MMHG | OXYGEN SATURATION: 99 % | BODY MASS INDEX: 21.76 KG/M2 | TEMPERATURE: 98 F | HEIGHT: 70 IN | WEIGHT: 152 LBS

## 2018-02-12 DIAGNOSIS — H61.23 BILATERAL IMPACTED CERUMEN: Primary | ICD-10-CM

## 2018-02-12 NOTE — MR AVS SNAPSHOT
[address] [place] [address] Drive [address] [place] [address] 
[phone] Patient: Lavon Talley MRN: [id number] Visit Information Date & Time Provider Department Dept. Phone Encounter #  
 2/12/2018  3:30 PM Asaf Mosquera, 9 [facility] [phone] [id number] Upcoming Health Maintenance Date Due DTaP/Tdap/Td series (1 - Tdap) 1/2/2011 Pneumococcal 65+ High/Highest Risk (2 of 2 - PPSV23) 1/1/2016 MEDICARE YEARLY EXAM 7/22/2018 GLAUCOMA SCREENING Q2Y 2/16/2019 Allergies as of 2/12/2018  Review Complete On: 2/12/2018 By: Misael Luna LPN Severity Noted Reaction Type Reactions Statins-hmg-coa Reductase Inhibitors  08/31/2015    Myalgia Current Immunizations  Reviewed on 1/26/2017 Name Date Influenza High Dose Vaccine PF 11/3/2016, 11/19/2015 Pneumococcal Vaccine (Unspecified Type) 1/1/2011 Td 1/1/2011 Zoster Vaccine, Live 1/1/2011 Not reviewed this visit Vitals BP Pulse Temp Resp Height(growth percentile) Weight(growth percentile) 135/88 (BP 1 Location: Left arm, BP Patient Position: Sitting) (!) 59 98 °F (36.7 °C) (Oral) 16 5' 10\" (1.778 m) 152 lb (68.9 kg) SpO2 BMI Smoking Status 99% 21.81 kg/m2 Former Smoker BMI and BSA Data Body Mass Index Body Surface Area  
 21.81 kg/m 2 1.84 m 2 Preferred Pharmacy Pharmacy Name Phone [id number] [phone] Your Updated Medication List  
  
   
This list is accurate as of: 2/12/18  3:31 PM.  Always use your most recent med list.  
  
  
  
  
 ELIQUIS 5 mg tablet Generic drug:  apixaban Take 5 mg by mouth two (2) times a day. simethicone 180 mg Cap Take 1 Tab by mouth daily as needed. sotalol 80 mg tablet Commonly known as:  Hayden Juan  
 Take 40 mg by mouth two (2) times a day. tamsulosin 0.4 mg capsule Commonly known as:  FLOMAX Take 0.4 mg by mouth daily. traZODone 50 mg tablet Commonly known as:  Shubham Haring Take 1 Tab by mouth nightly. Introducing [facility]! Reece Johnson introduces [email or web address] patient portal. Now you can access parts of your medical record, email your doctor's office, and request medication refills online. 1. In your internet browser, go to [email or web address] 2. Click on the First Time User? Click Here link in the Sign In box. You will see the New Member Sign Up page. 3. Enter your [employer] Access Code exactly as it appears below. You will not need to use this code after youve completed the sign-up process. If you do not sign up before the expiration date, you must request a new code. · [employer] Access Code: [email or web address] Expires: 5/13/2018  3:31 PM 
 
4. Enter the last four digits of your Social Security Number (xxxx) and Date of Birth (mm/dd/yyyy) as indicated and click Submit. You will be taken to the next sign-up page. 5. Create a [employer] ID. This will be your [employer] login ID and cannot be changed, so think of one that is secure and easy to remember. 6. Create a [employer] password. You can change your password at any time. 7. Enter your Password Reset Question and Answer. This can be used at a later time if you forget your password. 8. Enter your e-mail address. You will receive e-mail notification when new information is available in [address] 9. Click Sign Up. You can now view and download portions of your medical record. 10. Click the Download Summary menu link to download a portable copy of your medical information. If you have questions, please visit the Frequently Asked Questions section of the [employer] website. Remember, [employer] is NOT to be used for urgent needs. For medical emergencies, dial 911. Now available from your iPhone and Android! Please provide this summary of care documentation to your next provider. Your primary care clinician is listed as Kandi Villa. If you have any questions after today's visit, please call [phone].

## 2018-03-02 ENCOUNTER — PATIENT OUTREACH (OUTPATIENT)
Dept: INTERNAL MEDICINE CLINIC | Age: 83
End: 2018-03-02

## 2018-03-02 NOTE — PROGRESS NOTES
Patient has graduated from the Transitions of Care Coordination  program on 3/2/18. Patient attended [facility] appointment with Dr. Savannah Sutton on 2/5. Systolic BP ranging 110-880 at that time. Attempted to contact patient/wife for 30 day follow up. Unable to reach. Left detailed VMM requesting a return call. Resolving MARI episode.

## 2018-04-09 RX ORDER — TAMSULOSIN HYDROCHLORIDE 0.4 MG/1
0.4 CAPSULE ORAL DAILY
Qty: 90 CAP | Refills: 1 | Status: SHIPPED | OUTPATIENT
Start: 2018-04-09

## 2018-04-09 NOTE — TELEPHONE ENCOUNTER
Notified Cornelia Rattler per Dr Zay George that one capsule a day is adequate. She states understanding and will share the information with her . Medication loaded.

## 2018-04-09 NOTE — TELEPHONE ENCOUNTER
Spoke with Cornelia Gray and she is unsure if the patient should be taking every day or BID. Please advise. Medication needs to be sent to the pharmacy as a 90 day supply.

## 2018-04-09 NOTE — TELEPHONE ENCOUNTER
Wife called in stating that patient takes tamsulosin twice a day and it runs out in about 3 weeks. Can this be changed so he doesn't run out so quickly? Please call and advise.   [phone]

## 2018-06-24 RX ORDER — TRAZODONE HYDROCHLORIDE 50 MG/1
TABLET ORAL
Qty: 90 TAB | Refills: 1 | Status: SHIPPED | OUTPATIENT
Start: 2018-06-24 | End: 2018-08-20

## 2018-08-20 ENCOUNTER — OFFICE VISIT (OUTPATIENT)
Dept: INTERNAL MEDICINE CLINIC | Age: 83
End: 2018-08-20

## 2018-08-20 VITALS
BODY MASS INDEX: 21.62 KG/M2 | TEMPERATURE: 97.5 F | HEART RATE: 64 BPM | RESPIRATION RATE: 16 BRPM | DIASTOLIC BLOOD PRESSURE: 80 MMHG | OXYGEN SATURATION: 98 % | WEIGHT: 151 LBS | HEIGHT: 70 IN | SYSTOLIC BLOOD PRESSURE: 142 MMHG

## 2018-08-20 DIAGNOSIS — Z13.39 SCREENING FOR ALCOHOLISM: ICD-10-CM

## 2018-08-20 DIAGNOSIS — H61.21 IMPACTED CERUMEN OF RIGHT EAR: Primary | ICD-10-CM

## 2018-08-20 DIAGNOSIS — R14.0 ABDOMINAL BLOATING: ICD-10-CM

## 2018-08-20 DIAGNOSIS — Z00.00 MEDICARE ANNUAL WELLNESS VISIT, SUBSEQUENT: ICD-10-CM

## 2018-08-20 PROBLEM — I48.0 PAROXYSMAL ATRIAL FIBRILLATION (HCC): Status: ACTIVE | Noted: 2018-08-20

## 2018-08-20 RX ORDER — KETOCONAZOLE 20 MG/ML
SHAMPOO TOPICAL DAILY PRN
COMMUNITY

## 2018-08-20 RX ORDER — FLUOCINONIDE TOPICAL SOLUTION USP, 0.05% 0.5 MG/ML
SOLUTION TOPICAL 2 TIMES DAILY
COMMUNITY

## 2018-08-20 NOTE — PATIENT INSTRUCTIONS
Medicare Wellness Visit, Male    The best way to live healthy is to have a lifestyle where you eat a well-balanced diet, exercise regularly, limit alcohol use, and quit all forms of tobacco/nicotine, if applicable. Regular preventive services are another way to keep healthy. Preventive services (vaccines, screening tests, monitoring & exams) can help personalize your care plan, which helps you manage your own care. Screening tests can find health problems at the earliest stages, when they are easiest to treat. [address] [facility] follows the current, evidence-based guidelines published by the [facility] Fadi Martha ([facility]) when recommending preventive services for our patients. Because we follow these guidelines, sometimes recommendations change over time as research supports it. (For example, a prostate screening blood test is no longer routinely recommended for men with no symptoms.)    Of course, you and your provider may decide to screen more often for some diseases, based on your risk and co-morbidities (chronic disease you are already diagnosed with). Preventive services for you include:    - Medicare offers their members a free annual wellness visit, which is time for you and your primary care provider to discuss and plan for your preventive service needs. Take advantage of this benefit every year!    -All people over age 72 should receive the recommended pneumonia vaccines. Current USPSTF guidelines recommend a series of two vaccines for the best pneumonia protection.     -All adults should have a yearly flu vaccine and a tetanus vaccine every 10 years.  All adults age 61 years should receive a shingles vaccine once in their lifetime.      -All adults age 38-68 years who are overweight should have a diabetes screening test once every three years.     -Other screening tests & preventive services for persons with diabetes include: an eye exam to screen for diabetic retinopathy, a kidney function test, a foot exam, and stricter control over your cholesterol.     -Cardiovascular screening for adults with routine risk involves an electrocardiogram (ECG) at intervals determined by the provider.     -Colorectal cancer screenings should be done for adults age 54-65 years with normal risk. There are a number of acceptable methods of screening for this type of cancer. Each test has its own benefits and drawbacks. Discuss with your provider what is most appropriate for you during your annual wellness visit. The different tests include: colonoscopy (considered the best screening method), a fecal occult blood test, a fecal DNA test, and sigmoidoscopy.    -All adults born between [facility] should be screened once for Hepatitis C.    -An Abdominal Aortic Aneurysm (AAA) Screening is recommended for men age 73-68 who has ever smoked in their lifetime.      Here is a list of your current Health Maintenance items (your personalized list of preventive services) with a due date:  Health Maintenance Due   Topic Date Due    DTaP/Tdap/Td  (1 - Tdap) 01/02/2011    Pneumococcal Vaccine (2 of 2 - PPSV23) 01/01/2016    Annual Well Visit  07/22/2018    Flu Vaccine  08/01/2018

## 2018-08-20 NOTE — PROGRESS NOTES
Health Maintenance Due   Topic Date Due    DTaP/Tdap/Td series (1 - Tdap) 01/02/2011    Pneumococcal 65+ High/Highest Risk (2 of 2 - PPSV23) 01/01/2016    MEDICARE YEARLY EXAM  07/22/2018    Influenza Age 9 to Adult  08/01/2018           1. Have you been to the ER, urgent care clinic since your last visit? Hospitalized since your last visit? No    2. Have you seen or consulted any other health care providers outside of the [facility] since your last visit? Include any pap smears or colon screening. yes    3) Do you have an Advance Directive on file? no    4) Are you interested in receiving information on Advance Directives? YES      Patient is accompanied by self I have received verbal consent from Marli Bernardo to discuss any/all medical information while they are present in the room.

## 2018-08-20 NOTE — MR AVS SNAPSHOT
[address] [place] [address] [address] [facility] 
[phone] Patient: Diana Winters MRN: [id number] TLT:[birth year] Visit Information Date & Time Provider Department Dept. Phone Encounter #  
 8/20/2018  1:45 PM Jan Gilliam MD [facility] [phone] [id number] Upcoming Health Maintenance Date Due DTaP/Tdap/Td series (1 - Tdap) 1/2/2011 Pneumococcal 65+ High/Highest Risk (2 of 2 - PPSV23) 1/1/2016 MEDICARE YEARLY EXAM 7/22/2018 Influenza Age 5 to Adult 8/1/2018 GLAUCOMA SCREENING Q2Y 2/16/2019 Allergies as of 8/20/2018  Review Complete On: 8/20/2018 By: Doug Landry LPN Severity Noted Reaction Type Reactions Statins-hmg-coa Reductase Inhibitors  08/31/2015    Myalgia Current Immunizations  Reviewed on 1/26/2017 Name Date Influenza High Dose Vaccine PF 11/3/2016, 11/19/2015 Pneumococcal Vaccine (Unspecified Type) 1/1/2011 Td 1/1/2011 Zoster Vaccine, Live 1/1/2011 Not reviewed this visit You Were Diagnosed With   
  
 Codes Comments Impacted cerumen of right ear    -  Primary ICD-10-CM: H61.21 ICD-9-CM: 380.4 Medicare annual wellness visit, subsequent     ICD-10-CM: Z00.00 ICD-9-CM: V70.0 Screening for alcoholism     ICD-10-CM: Z13.89 ICD-9-CM: V79.1 Vitals BP Pulse Temp Resp Height(growth percentile) Weight(growth percentile) 142/80 (BP 1 Location: Left arm, BP Patient Position: Sitting) 64 97.5 °F (36.4 °C) (Oral) 16 5' 10\" (1.778 m) 151 lb (68.5 kg) SpO2 BMI Smoking Status 98% 21.67 kg/m2 Former Smoker Vitals History BMI and BSA Data Body Mass Index Body Surface Area  
 21.67 kg/m 2 1.84 m 2 Preferred Pharmacy Pharmacy Name Phone [id number] [place] [address] [place] [phone] Your Updated Medication List  
  
   
 This list is accurate as of 18  2:18 PM.  Always use your most recent med list.  
  
  
  
  
 B-12 DOTS PO Take  by mouth. BIOTIN PO Take  by mouth. ELIQUIS 5 mg tablet Generic drug:  apixaban Take 5 mg by mouth two (2) times a day. fluocinoNIDE 0.05 % external solution Commonly known as:  LIDEX Apply  to affected area two (2) times a day.  
  
 ketoconazole 2 % shampoo Commonly known as:  NIZORAL Apply  to affected area daily as needed for Itching. pneumococcal 13 tyrone conj dip 0.5 mL Syrg injection Commonly known as:  PREVNAR 13 (PF)  
0.5 mL by IntraMUSCular route once for 1 dose. PROBIOTIC 4X 10-15 mg Tbec Generic drug:  B.infantis-B.ani-B.long-B.bifi Take  by mouth. selenium sulfide 1 % shampoo Commonly known as:  [address] Apply  to affected area as needed for Dandruff.  
  
 simethicone 180 mg Cap Take 1 Tab by mouth daily as needed. sotalol 80 mg tablet Commonly known as:  [place] Take 40 mg by mouth two (2) times a day. tamsulosin 0.4 mg capsule Commonly known as:  FLOMAX Take 1 Cap by mouth daily. traZODone 50 mg tablet Commonly known as:  Junella Mckeon Take 1 Tab by mouth nightly. varicella-zoster recombinant (PF) 50 mcg/0.5 mL Susr injection Commonly known as:  SHINGRIX (PF)  
0.5mL by IntraMUSCular route once now and then repeat in 2-6 months VITAMIN D3 PO Take  by mouth. Prescriptions Printed Refills  
 pneumococcal 13 tyrone conj dip (PREVNAR 13, PF,) 0.5 mL syrg injection 0 Si.5 mL by IntraMUSCular route once for 1 dose. Class: Print Route: IntraMUSCular  
 varicella-zoster recombinant, PF, (SHINGRIX, PF,) 50 mcg/0.5 mL susr injection 1 Si.5mL by IntraMUSCular route once now and then repeat in 2-6 months Class: Print We Performed the Following MD ANNUAL ALCOHOL SCREEN 15 MIN [id number] [facility]] REMOVE IMPACTED EAR WAX [23504 CPT(R)] Patient Instructions Medicare Wellness Visit, Male The best way to live healthy is to have a lifestyle where you eat a well-balanced diet, exercise regularly, limit alcohol use, and quit all forms of tobacco/nicotine, if applicable. Regular preventive services are another way to keep healthy. Preventive services (vaccines, screening tests, monitoring & exams) can help personalize your care plan, which helps you manage your own care. Screening tests can find health problems at the earliest stages, when they are easiest to treat. [address] [facility] follows the current, evidence-based guidelines published by the [facility] Fadi Thomas ([facility]) when recommending preventive services for our patients. Because we follow these guidelines, sometimes recommendations change over time as research supports it. (For example, a prostate screening blood test is no longer routinely recommended for men with no symptoms.) Of course, you and your provider may decide to screen more often for some diseases, based on your risk and co-morbidities (chronic disease you are already diagnosed with). Preventive services for you include: - Medicare offers their members a free annual wellness visit, which is time for you and your primary care provider to discuss and plan for your preventive service needs. Take advantage of this benefit every year! 
 
-All people over age 72 should receive the recommended pneumonia vaccines. Current USPSTF guidelines recommend a series of two vaccines for the best pneumonia protection.  
 
-All adults should have a yearly flu vaccine and a tetanus vaccine every 10 years.  All adults age 61 years should receive a shingles vaccine once in their lifetime.   
 
-All adults age 38-68 years who are overweight should have a diabetes screening test once every three years.  
 
-Other screening tests & preventive services for persons with diabetes include: an eye exam to screen for diabetic retinopathy, a kidney function test, a foot exam, and stricter control over your cholesterol.  
 
-Cardiovascular screening for adults with routine risk involves an electrocardiogram (ECG) at intervals determined by the provider.  
 
-Colorectal cancer screenings should be done for adults age 54-65 years with normal risk. There are a number of acceptable methods of screening for this type of cancer. Each test has its own benefits and drawbacks. Discuss with your provider what is most appropriate for you during your annual wellness visit. The different tests include: colonoscopy (considered the best screening method), a fecal occult blood test, a fecal DNA test, and sigmoidoscopy. 
 
-All adults born between [facility] should be screened once for Hepatitis C. 
 
-An Abdominal Aortic Aneurysm (AAA) Screening is recommended for men age 73-68 who has ever smoked in their lifetime. Here is a list of your current Health Maintenance items (your personalized list of preventive services) with a due date: 
Health Maintenance Due Topic Date Due  
 DTaP/Tdap/Td  (1 - Tdap) 01/02/2011  Pneumococcal Vaccine (2 of 2 - PPSV23) 01/01/2016 [address] [place] [address] Annual Well Visit  07/22/2018  Flu Vaccine  08/01/2018 Introducing [facility] [place] [facility]! [facility] introduces [employer] patient portal. Now you can access parts of your medical record, email your doctor's office, and request medication refills online. 1. In your internet browser, go to [email or web address] 2. Click on the First Time User? Click Here link in the Sign In box. You will see the New Member Sign Up page. 3. Enter your [employer] Access Code exactly as it appears below. You will not need to use this code after youve completed the sign-up process. If you do not sign up before the expiration date, you must request a new code. · [employer] Access Code: [email or web address] Expires: 11/18/2018  1:40 PM 
 4. Enter the last four digits of your Social Security Number (xxxx) and Date of Birth (mm/dd/yyyy) as indicated and click Submit. You will be taken to the next sign-up page. 5. Create a [employer] ID. This will be your [employer] login ID and cannot be changed, so think of one that is secure and easy to remember. 6. Create a [employer] password. You can change your password at any time. 7. Enter your Password Reset Question and Answer. This can be used at a later time if you forget your password. 8. Enter your e-mail address. You will receive e-mail notification when new information is available in [address] 9. Click Sign Up. You can now view and download portions of your medical record. 10. Click the Download Summary menu link to download a portable copy of your medical information. If you have questions, please visit the Frequently Asked Questions section of the [employer] website. Remember, [employer] is NOT to be used for urgent needs. For medical emergencies, dial 911. Now available from your iPhone and Android! Please provide this summary of care documentation to your next provider. Your primary care clinician is listed as Deborah Jarvis. If you have any questions after today's visit, please call [phone].

## 2018-08-20 NOTE — PROGRESS NOTES
This is the Subsequent Medicare Annual Wellness Exam, performed 12 months or more after the Initial AWV or the last Subsequent AWV    I have reviewed the patient's medical history in detail and updated the computerized patient record. History     Past Medical History:   Diagnosis Date    Hypertension     Tinnitus       Past Surgical History:   Procedure Laterality Date    HX LITHOTRIPSY      HX PROSTATECTOMY  ~2012    prostatectomy    HX UROLOGICAL  2016    patient not sure of name of procedure, completed by Dr. Chanda Harris  2010    prostatectomy     Current Outpatient Prescriptions   Medication Sig Dispense Refill    B.infantis-B.ani-B.long-B.bifi (PROBIOTIC 4X) 10-15 mg TbEC Take  by mouth.  cyanocobalamin, vitamin B-12, (B-12 DOTS PO) Take  by mouth.  BIOTIN PO Take  by mouth.  cholecalciferol, vitamin D3, (VITAMIN D3 PO) Take  by mouth.  ketoconazole (NIZORAL) 2 % shampoo Apply  to affected area daily as needed for Itching.  fluocinoNIDE (LIDEX) 0.05 % external solution Apply  to affected area two (2) times a day.  selenium sulfide (SELSUN BLUE) 1 % shampoo Apply  to affected area as needed for Dandruff.  pneumococcal 13 tyrone conj dip (PREVNAR 13, PF,) 0.5 mL syrg injection 0.5 mL by IntraMUSCular route once for 1 dose. 0.5 mL 0    varicella-zoster recombinant, PF, (SHINGRIX, PF,) 50 mcg/0.5 mL susr injection 0.5mL by IntraMUSCular route once now and then repeat in 2-6 months 0.5 mL 1    tamsulosin (FLOMAX) 0.4 mg capsule Take 1 Cap by mouth daily. 90 Cap 1    apixaban (ELIQUIS) 5 mg tablet Take 5 mg by mouth two (2) times a day.  sotalol (BETAPACE) 80 mg tablet Take 40 mg by mouth two (2) times a day.  simethicone 180 mg cap Take 1 Tab by mouth daily as needed.  traZODone (DESYREL) 50 mg tablet Take 1 Tab by mouth nightly.  90 Tab 1     Allergies   Allergen Reactions    Statins-Hmg-Coa Reductase Inhibitors Myalgia     Family History Problem Relation Age of Onset    No Known Problems Mother     Heart Disease Father     Lung Disease Father      Social History   Substance Use Topics    Smoking status: Former Smoker     Years: 5.00    Smokeless tobacco: Never Used    Alcohol use 0.6 oz/week     1 Glasses of wine per week      Comment: nightly     Patient Active Problem List   Diagnosis Code    Prostate CA ([facility] [address] C61    CAD (coronary artery disease) I25.10    Tinnitus H93.19    Statin intolerance Z78.9    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 Functional Ability and Level of Safety:   Hearing Loss  The patient wears hearing aids. The patient needs further evaluation. Activities of Daily Living  The home contains: no safety equipment. Patient does total self care    Fall Risk  Fall Risk Assessment, last 12 mths 2/5/2018   Able to walk? Yes   Fall in past 12 months?  No       Abuse Screen  Patient is not abused    Cognitive Screening   Evaluation of Cognitive Function:  Has your family/caregiver stated any concerns about your memory: yes  Normal    Patient Care Team   Patient Care Team:  Enrique Block MD as PCP - General (Internal Medicine)  Irving Paris MD (Urology)  Lisy William MD (Cardiology)  Rick Tenorio MD (Otolaryngology)  Yesika Candelaria MD (Ophthalmology)  Aneudy Champagne, MARÍA as Ambulatory Care Navigator (Internal Medicine)    Assessment/Plan   Education and counseling provided:  Are appropriate based on today's review and evaluation  End-of-Life planning (with patient's consent)    Diagnoses and all orders for this visit:    1. Medicare annual wellness visit, subsequent    2. Screening for alcoholism  -     Annual  Alcohol Screen 15 min ()    Other orders  -     pneumococcal 13 tyrone conj dip (PREVNAR 13, PF,) 0.5 mL syrg injection; 0.5 mL by IntraMUSCular route once for 1 dose.  -     varicella-zoster recombinant, PF, (SHINGRIX, PF,) 50 mcg/0.5 mL susr injection; 0.5mL by IntraMUSCular route once now and then repeat in 2-6 months      Health Maintenance Due   Topic Date Due    DTaP/Tdap/Td series (1 - Tdap) 01/02/2011    Pneumococcal 65+ High/Highest Risk (2 of 2 - PPSV23) 01/01/2016    MEDICARE YEARLY EXAM  07/22/2018    Influenza Age 9 to Adult  08/01/2018     Gassy bloating  From foods and eating too many bean products  Seeing card now Chandana Hatchet happier  Cardiovascular ROS: no chest pain or dyspnea on exertion  negative for - orthopnea, palpitations, paroxysmal nocturnal dyspnea, rapid heart rate or shortness of breath      Subjective:     Destiney Drake is a 80 y.o. male who presents for evaluation of a plugged ear. He has noticed right symptoms 4 week ago. There is a prior history of cerumen impaction. Patient denies ear pain. Patient has hearing loss. Objective:     Visit Vitals    /80 (BP 1 Location: Left arm, BP Patient Position: Sitting)    Pulse 64    Temp 97.5 °F (36.4 °C) (Oral)    Resp 16    Ht 5' 10\" (1.778 m)    Wt 151 lb (68.5 kg)    SpO2 98%    BMI 21.67 kg/m2     Rhythm regular now  No edema abd soft  General: alert, cooperative, no distress   Right Ear: ceruminosis noted, cerumen removed. Left Ear:  left ear normal.    After removal: bilateral TM's and external ear canals normal, cerumen removed. Oropharynx:   normal.   Neck:  supple, symmetrical, trachea midline and no adenopathy. Assessment/Plan:     Cerumen Impaction, without otitis externa. 1. Cerumen removed by flushing. 2. Care instructions given.   3. Home treatment: none  4. Followup as needed. Diagnoses and all orders for this visit:    1. Impacted cerumen of right ear  -     REMOVE IMPACTED EAR WAX    2. Medicare annual wellness visit, subsequent    3. Screening for alcoholism  -     Annual  Alcohol Screen 15 min ()    Other orders  -     pneumococcal 13 tyrone conj dip (PREVNAR 13, PF,) 0.5 mL syrg injection; 0.5 mL by IntraMUSCular route once for 1 dose.  -     varicella-zoster recombinant, PF, (SHINGRIX, PF,) 50 mcg/0.5 mL susr injection; 0.5mL by IntraMUSCular route once now and then repeat in 2-6 months    . 1. Medicare annual wellness visit, subsequent  Needs vaccines    2. Screening for alcoholism  none  - Annual  Alcohol Screen 15 min ()    3. Impacted cerumen of right ear  removed  - REMOVE IMPACTED EAR WAX    4.  Abdominal bloating  Stop bean products and leafy green  Salads  Stop probiotics

## 2018-11-13 ENCOUNTER — HOSPITAL ENCOUNTER (OUTPATIENT)
Dept: LAB | Age: 83
Discharge: HOME | End: 2018-11-13

## 2018-12-05 RX ORDER — TRAZODONE HYDROCHLORIDE 50 MG/1
TABLET ORAL
Qty: 90 TAB | Refills: 1 | Status: SHIPPED | OUTPATIENT
Start: 2018-12-05 | End: 2019-05-29 | Stop reason: SDUPTHER

## 2018-12-06 LAB — CREATININE, EXTERNAL: 0.83

## 2019-02-19 ENCOUNTER — OFFICE VISIT (OUTPATIENT)
Dept: INTERNAL MEDICINE CLINIC | Age: 84
End: 2019-02-19

## 2019-02-19 VITALS
RESPIRATION RATE: 18 BRPM | HEIGHT: 70 IN | BODY MASS INDEX: 21.05 KG/M2 | SYSTOLIC BLOOD PRESSURE: 140 MMHG | OXYGEN SATURATION: 100 % | WEIGHT: 147 LBS | TEMPERATURE: 96 F | HEART RATE: 54 BPM | DIASTOLIC BLOOD PRESSURE: 80 MMHG

## 2019-02-19 DIAGNOSIS — M54.40 CHRONIC LOW BACK PAIN WITH SCIATICA, SCIATICA LATERALITY UNSPECIFIED, UNSPECIFIED BACK PAIN LATERALITY: ICD-10-CM

## 2019-02-19 DIAGNOSIS — H91.90 HEARING LOSS, UNSPECIFIED HEARING LOSS TYPE, UNSPECIFIED LATERALITY: ICD-10-CM

## 2019-02-19 DIAGNOSIS — I25.10 CORONARY ARTERY DISEASE INVOLVING NATIVE CORONARY ARTERY OF NATIVE HEART WITHOUT ANGINA PECTORIS: Primary | ICD-10-CM

## 2019-02-19 DIAGNOSIS — R41.89 COGNITIVE DECLINE: ICD-10-CM

## 2019-02-19 DIAGNOSIS — R23.8 SCALP IRRITATION: ICD-10-CM

## 2019-02-19 DIAGNOSIS — I11.9 HYPERTENSIVE HEART DISEASE WITHOUT HEART FAILURE: ICD-10-CM

## 2019-02-19 DIAGNOSIS — I48.0 PAROXYSMAL ATRIAL FIBRILLATION (HCC): ICD-10-CM

## 2019-02-19 DIAGNOSIS — G89.29 CHRONIC LOW BACK PAIN WITH SCIATICA, SCIATICA LATERALITY UNSPECIFIED, UNSPECIFIED BACK PAIN LATERALITY: ICD-10-CM

## 2019-02-19 PROBLEM — M54.9 CHRONIC BACK PAIN: Status: ACTIVE | Noted: 2019-02-19

## 2019-02-19 NOTE — PROGRESS NOTES
1. Have you been to the ER, urgent care clinic since your last visit? Hospitalized since your last visit? No 
 
2. Have you seen or consulted any other health care providers outside of the [address] [place] [address] since your last visit? Include any pap smears or colon screening. Yes. Orthopedics-[place]-lumbar injection, derm-dr richardson, dr smith-urology, cardio-dr Cynthia Otoole

## 2019-02-19 NOTE — PROGRESS NOTES
Subjective:  
Dexter Sorto is a 80 y.o. male  here for follow up of chronic medical conditions listed Patient Active Problem List  
 Diagnosis Date Noted  Chronic back pain 02/19/2019  [facility]ox[facility] ([facility] 08/20/2018  ACP (advance care planning) 07/21/2017  CVA, old, cognitive deficits 02/13/2017  Cognitive decline 09/02/2016  Urethral stricture 03/02/2016  Essential hypertension 03/02/2016  Insomnia 03/02/2016  Statin intolerance 02/11/2016  Prostate CA ([facility] 08/31/2015  CAD (coronary artery disease) 08/31/2015  Tinnitus 08/31/2015 Current Outpatient Medications Medication Sig Dispense Refill  FINASTERIDE PO Take  by mouth.  traZODone (DESYREL) 50 mg tablet TAKE 1 TAB BY MOUTH NIGHTLY. 90 Tab 1  
 B.infantis-B.ani-B.long-B.bifi (PROBIOTIC 4X) 10-15 mg TbEC Take  by mouth.  cyanocobalamin, vitamin B-12, (B-12 DOTS PO) Take  by mouth.  BIOTIN PO Take  by mouth.  cholecalciferol, vitamin D3, (VITAMIN D3 PO) Take  by mouth.  ketoconazole (NIZORAL) 2 % shampoo Apply  to affected area daily as needed for Itching.  fluocinoNIDE (LIDEX) 0.05 % external solution Apply  to affected area two (2) times a day.  selenium sulfide (SELSUN BLUE) 1 % shampoo Apply  to affected area as needed for Dandruff.  varicella-zoster recombinant, PF, (SHINGRIX, PF,) 50 mcg/0.5 mL susr injection 0.5mL by IntraMUSCular route once now and then repeat in 2-6 months 0.5 mL 1  
 tamsulosin (FLOMAX) 0.4 mg capsule Take 1 Cap by mouth daily. 90 Cap 1  
 apixaban (ELIQUIS) 5 mg tablet Take 5 mg by mouth two (2) times a day.  sotalol (BETAPACE) 80 mg tablet Take 40 mg by mouth two (2) times a day.  simethicone 180 mg cap Take 1 Tab by mouth daily as needed.  traZODone (DESYREL) 50 mg tablet Take 1 Tab by mouth nightly. 90 Tab 1 Past Medical History:  
Diagnosis Date  Hypertension  Tinnitus Past Surgical History: Procedure Laterality Date  HX LITHOTRIPSY  HX PROSTATECTOMY  [id number]  
 prostatectomy  HX UROLOGICAL  2016  
 patient not sure of name of procedure, completed by Dr. Hudson Richard  HX UROLOGICAL  2010  
 prostatectomy Tatiana Hall He  is accompanied by patient and spouse. He lives as follows: a spouse and does have Advanced Directives. New Complaints today include: Arthritis (osteo); Irregular Heart Beat (a fib); Coronary Artery Disease; Cerebrovascular Accident (hx of); Hypertension; and Cholesterol Problem Recent History includes: has lost $10, 000 hearing aids has tinnitus Review of Systems Ears, nose, mouth, throat, and face: positive for tinnitus Respiratory: negative Cardiovascular: negative for irregular heart beats, near-syncope, syncope Gastrointestinal: negative Genitourinary:negative Integument/breast: positive for scalp irritation reviewed derm Therapy Musculoskeletal:positive for arthralgias, stiff joints, neck pain and back pain Neurological: positive for cognitive decline Geriatric Issues: Activities of Daily Living (ADLs):   
He is independent in the following: ambulation, bathing and hygiene, feeding, continence, grooming, toileting and dressing Requires assistance with the following: none Cognative status:  mild improvement Outside reports reviewed: office notes. Objective:  
 
Visit Vitals /80 Pulse (!) 54 Temp 96 °F (35.6 °C) (Oral) Resp 18 Ht 5' 10\" (1.778 m) Wt 147 lb (66.7 kg) SpO2 100% BMI 21.09 kg/m² Visit Vitals /80 Pulse (!) 54 Temp 96 °F (35.6 °C) (Oral) Resp 18 Ht 5' 10\" (1.778 m) Wt 147 lb (66.7 kg) SpO2 100% BMI 21.09 kg/m² General appearance: alert, cooperative, no distress, appears stated age Ears: normal TM's and external ear canals AU Neck: supple, symmetrical, trachea midline, no adenopathy, thyroid: not enlarged, symmetric, no tenderness/mass/nodules, no carotid bruit and no JVD Chest wall: no tenderness Heart: regular rate and rhythm, S1, S2 normal, no murmur, click, rub or gallop Abdomen: soft, non-tender. Bowel sounds normal. No masses,  no organomegaly Skin: scale gone scalp  Has hair loss and has bumpy feel Imaging Lab Review Lab Results Component Value Date/Time Cholesterol, total 213 (H) 03/02/2016 02:22 PM  
 HDL Cholesterol 49 03/02/2016 02:22 PM  
 LDL, calculated 136 (H) 03/02/2016 02:22 PM  
 LDL-C, External 147 05/27/2015 Triglyceride 141 03/02/2016 02:22 PM  
 
Lab Results Component Value Date/Time GFR est non-AA 81 02/05/2018 04:10 PM  
 GFRNA, POC >60 04/08/2013 03:59 PM  
 GFR est AA 93 02/05/2018 04:10 PM  
 GFRAA, POC >60 04/08/2013 03:59 PM  
 Creatinine 0.83 02/05/2018 04:10 PM  
 Creatinine (POC) 0.9 04/08/2013 03:59 PM  
 BUN 16 02/05/2018 04:10 PM  
 Sodium 144 02/05/2018 04:10 PM  
 Potassium 4.7 02/05/2018 04:10 PM  
 Chloride 101 02/05/2018 04:10 PM  
 CO2 21 02/05/2018 04:10 PM  
 Magnesium 2.3 02/11/2016 09:14 AM  
  
 
Assessment/Plan:  
Diagnoses and all orders for this visit: 1. Coronary artery disease involving native coronary artery of native heart without angina pectoris 2. Cognitive decline 3. Paroxysmal atrial fibrillation (HCC) 
-     CBC WITH AUTOMATED DIFF 
-     LIPID PANEL 
-     METABOLIC PANEL, COMPREHENSIVE 
-     TSH 3RD GENERATION 4. Chronic low back pain with sciatica, sciatica laterality unspecified, unspecified back pain laterality 5. Hearing loss, unspecified hearing loss type, unspecified laterality 6. Hypertensive heart disease without heart failure -     LIPID PANEL 7. Scalp irritation 1. Coronary artery disease involving native coronary artery of native heart without angina pectoris 
labs 2. Cognitive decline 
gradual 
 
3. Paroxysmal atrial fibrillation (HCC) None lately on apixiban 
- CBC WITH AUTOMATED DIFF 
- LIPID PANEL 
- METABOLIC PANEL, COMPREHENSIVE 
 - TSH 3RD GENERATION 4. Chronic low back pain with sciatica, sciatica laterality unspecified, unspecified back pain laterality Epidural helping 5. Hearing loss, unspecified hearing loss type, unspecified laterality Canals clear See audiology 6. Hypertensive heart disease without heart failure - LIPID PANEL 7. Scalp irritation No cleear answers

## 2019-02-20 LAB
ALBUMIN SERPL-MCNC: 4.9 G/DL (ref 3.5–4.7)
ALBUMIN/GLOB SERPL: 2.2 {RATIO} (ref 1.2–2.2)
ALP SERPL-CCNC: 68 IU/L (ref 39–117)
ALT SERPL-CCNC: 17 IU/L (ref 0–44)
AST SERPL-CCNC: 18 IU/L (ref 0–40)
BASOPHILS # BLD AUTO: 0 X10E3/UL (ref 0–0.2)
BASOPHILS NFR BLD AUTO: 0 %
BILIRUB SERPL-MCNC: 0.9 MG/DL (ref 0–1.2)
BUN SERPL-MCNC: 14 MG/DL (ref 8–27)
BUN/CREAT SERPL: 19 (ref 10–24)
CALCIUM SERPL-MCNC: 9.6 MG/DL (ref 8.6–10.2)
CHLORIDE SERPL-SCNC: 102 MMOL/L (ref 96–106)
CHOLEST SERPL-MCNC: 226 MG/DL (ref 100–199)
CO2 SERPL-SCNC: 24 MMOL/L (ref 20–29)
CREAT SERPL-MCNC: 0.75 MG/DL (ref 0.76–1.27)
EOSINOPHIL # BLD AUTO: 0.1 X10E3/UL (ref 0–0.4)
EOSINOPHIL NFR BLD AUTO: 1 %
ERYTHROCYTE [DISTWIDTH] IN BLOOD BY AUTOMATED COUNT: 14.1 % (ref 12.3–15.4)
GLOBULIN SER CALC-MCNC: 2.2 G/DL (ref 1.5–4.5)
GLUCOSE SERPL-MCNC: 86 MG/DL (ref 65–99)
HCT VFR BLD AUTO: 46.5 % (ref 37.5–51)
HDLC SERPL-MCNC: 50 MG/DL
HGB BLD-MCNC: 15.3 G/DL (ref 13–17.7)
IMM GRANULOCYTES # BLD AUTO: 0.1 X10E3/UL (ref 0–0.1)
IMM GRANULOCYTES NFR BLD AUTO: 1 %
INTERPRETATION, 910389: NORMAL
LDLC SERPL CALC-MCNC: 154 MG/DL (ref 0–99)
LYMPHOCYTES # BLD AUTO: 1.4 X10E3/UL (ref 0.7–3.1)
LYMPHOCYTES NFR BLD AUTO: 16 %
MCH RBC QN AUTO: 32.8 PG (ref 26.6–33)
MCHC RBC AUTO-ENTMCNC: 32.9 G/DL (ref 31.5–35.7)
MCV RBC AUTO: 100 FL (ref 79–97)
MONOCYTES # BLD AUTO: 0.8 X10E3/UL (ref 0.1–0.9)
MONOCYTES NFR BLD AUTO: 9 %
NEUTROPHILS # BLD AUTO: 6.4 X10E3/UL (ref 1.4–7)
NEUTROPHILS NFR BLD AUTO: 73 %
PLATELET # BLD AUTO: 239 X10E3/UL (ref 150–379)
POTASSIUM SERPL-SCNC: 4.6 MMOL/L (ref 3.5–5.2)
PROT SERPL-MCNC: 7.1 G/DL (ref 6–8.5)
RBC # BLD AUTO: 4.66 X10E6/UL (ref 4.14–5.8)
SODIUM SERPL-SCNC: 143 MMOL/L (ref 134–144)
TRIGL SERPL-MCNC: 110 MG/DL (ref 0–149)
TSH SERPL DL<=0.005 MIU/L-ACNC: 1.61 UIU/ML (ref 0.45–4.5)
VLDLC SERPL CALC-MCNC: 22 MG/DL (ref 5–40)
WBC # BLD AUTO: 8.8 X10E3/UL (ref 3.4–10.8)

## 2019-05-29 RX ORDER — TRAZODONE HYDROCHLORIDE 50 MG/1
TABLET ORAL
Qty: 90 TAB | Refills: 1 | Status: SHIPPED | OUTPATIENT
Start: 2019-05-29 | End: 2019-08-20 | Stop reason: SDUPTHER

## 2019-08-20 ENCOUNTER — OFFICE VISIT (OUTPATIENT)
Dept: INTERNAL MEDICINE CLINIC | Age: 84
End: 2019-08-20

## 2019-08-20 VITALS
BODY MASS INDEX: 21.19 KG/M2 | OXYGEN SATURATION: 98 % | SYSTOLIC BLOOD PRESSURE: 136 MMHG | WEIGHT: 148 LBS | HEART RATE: 55 BPM | HEIGHT: 70 IN | DIASTOLIC BLOOD PRESSURE: 74 MMHG

## 2019-08-20 DIAGNOSIS — H61.23 BILATERAL IMPACTED CERUMEN: Primary | ICD-10-CM

## 2019-08-20 DIAGNOSIS — I48.0 PAROXYSMAL ATRIAL FIBRILLATION (HCC): ICD-10-CM

## 2019-08-20 DIAGNOSIS — Z13.31 SCREENING FOR DEPRESSION: ICD-10-CM

## 2019-08-20 DIAGNOSIS — H93.13 TINNITUS OF BOTH EARS: ICD-10-CM

## 2019-08-20 DIAGNOSIS — I10 ESSENTIAL HYPERTENSION: ICD-10-CM

## 2019-08-20 DIAGNOSIS — Z00.00 MEDICARE ANNUAL WELLNESS VISIT, SUBSEQUENT: ICD-10-CM

## 2019-08-20 RX ORDER — FAMOTIDINE 20 MG/1
20 TABLET, FILM COATED ORAL
Qty: 60 TAB | Refills: 5
Start: 2019-08-20

## 2019-08-20 NOTE — PROGRESS NOTES
Chief Complaint   Patient presents with    Irregular Heart Beat    Coronary Artery Disease    Arthritis     ra    Cholesterol Problem    Other     hx of prostate ca     Visit Vitals  /74   Pulse (!) 55   Ht 5' 10\" (1.778 m)   Wt 148 lb (67.1 kg)   SpO2 98%   BMI 21.24 kg/m²     1. Have you been to the ER, urgent care clinic since your last visit? Hospitalized since your last visit no    2. Have you seen or consulted any other health care providers outside of the [address] [place] [address] since your last visit? Include any pap smears or colon screening.  no

## 2019-08-20 NOTE — PATIENT INSTRUCTIONS
Medicare Wellness Visit, Male    The best way to live healthy is to have a lifestyle where you eat a well-balanced diet, exercise regularly, limit alcohol use, and quit all forms of tobacco/nicotine, if applicable. Regular preventive services are another way to keep healthy. Preventive services (vaccines, screening tests, monitoring & exams) can help personalize your care plan, which helps you manage your own care. Screening tests can find health problems at the earliest stages, when they are easiest to treat. [facility] follows the current, evidence-based guidelines published by the [facility] ([facility]) when recommending preventive services for our patients. Because we follow these guidelines, sometimes recommendations change over time as research supports it. (For example, a prostate screening blood test is no longer routinely recommended for men with no symptoms.)  Of course, you and your doctor may decide to screen more often for some diseases, based on your risk and co-morbidities (chronic disease you are already diagnosed with). Preventive services for you include:  - Medicare offers their members a free annual wellness visit, which is time for you and your primary care provider to discuss and plan for your preventive service needs. Take advantage of this benefit every year!  -All adults over age 72 should receive the recommended pneumonia vaccines. Current USPSTF guidelines recommend a series of two vaccines for the best pneumonia protection.   -All adults should have a flu vaccine yearly and an ECG.  All adults age 61 and older should receive a shingles vaccine once in their lifetime.    -All adults age 38-68 who are overweight should have a diabetes screening test once every three years.   -Other screening tests & preventive services for persons with diabetes include: an eye exam to screen for diabetic retinopathy, a kidney function test, a foot exam, and stricter control over your cholesterol.   -Cardiovascular screening for adults with routine risk involves an electrocardiogram (ECG) at intervals determined by the provider.   -Colorectal cancer screening should be done for adults age 54-65 with no increased risk factors for colorectal cancer. There are a number of acceptable methods of screening for this type of cancer. Each test has its own benefits and drawbacks. Discuss with your provider what is most appropriate for you during your annual wellness visit. The different tests include: colonoscopy (considered the best screening method), a fecal occult blood test, a fecal DNA test, and sigmoidoscopy.  -All adults born between [facility] should be screened once for Hepatitis C.  -An Abdominal Aortic Aneurysm (AAA) Screening is recommended for men age 73-68 who has ever smoked in their lifetime.      Here is a list of your current Health Maintenance items (your personalized list of preventive services) with a due date:  Health Maintenance Due   Topic Date Due    Shingles Vaccine (1 of 2) 01/08/1984    DTaP/Tdap/Td  (1 - Tdap) 01/02/2011    Pneumococcal Vaccine (2 of 2 - PCV13) 01/01/2012    Glaucoma Screening   02/16/2019    Flu Vaccine  08/01/2019    Annual Well Visit  08/21/2019

## 2019-08-20 NOTE — PROGRESS NOTES
This is the Subsequent Medicare Annual Wellness Exam, performed 12 months or more after the Initial AWV or the last Subsequent AWV    I have reviewed the patient's medical history in detail and updated the computerized patient record. History     Past Medical History:   Diagnosis Date    Hypertension     Tinnitus       Past Surgical History:   Procedure Laterality Date    HX LITHOTRIPSY      HX PROSTATECTOMY  ~2012    prostatectomy    HX UROLOGICAL  2016    patient not sure of name of procedure, completed by Dr. Mary Lou Wade  2010    prostatectomy     Current Outpatient Medications   Medication Sig Dispense Refill    varicella-zoster recombinant, PF, (SHINGRIX, PF,) 50 mcg/0.5 mL susr injection 0.5mL by IntraMUSCular route once now and then repeat in 2-6 months 0.5 mL 1    FINASTERIDE PO Take  by mouth.  B.infantis-B.ani-B.long-B.bifi (PROBIOTIC 4X) 10-15 mg TbEC Take  by mouth.  cyanocobalamin, vitamin B-12, (B-12 DOTS PO) Take  by mouth.  BIOTIN PO Take  by mouth.  cholecalciferol, vitamin D3, (VITAMIN D3 PO) Take  by mouth.  ketoconazole (NIZORAL) 2 % shampoo Apply  to affected area daily as needed for Itching.  fluocinoNIDE (LIDEX) 0.05 % external solution Apply  to affected area two (2) times a day.  selenium sulfide (SELSUN BLUE) 1 % shampoo Apply  to affected area as needed for Dandruff.  tamsulosin (FLOMAX) 0.4 mg capsule Take 1 Cap by mouth daily. 90 Cap 1    apixaban (ELIQUIS) 5 mg tablet Take 5 mg by mouth two (2) times a day.  sotalol (BETAPACE) 80 mg tablet Take 40 mg by mouth two (2) times a day.  traZODone (DESYREL) 50 mg tablet Take 1 Tab by mouth nightly. 90 Tab 1    simethicone 180 mg cap Take 1 Tab by mouth daily as needed.        Allergies   Allergen Reactions    Statins-Hmg-Coa Reductase Inhibitors Myalgia     Family History   Problem Relation Age of Onset    No Known Problems Mother     Heart Disease Father    [facility] Lung Disease Father      Social History     Tobacco Use    Smoking status: Former Smoker     Years: 5.00    Smokeless tobacco: Never Used   Substance Use Topics    Alcohol use: Yes     Alcohol/week: 1.0 standard drinks     Types: 1 Glasses of wine per week     Comment: nightly     Patient Active Problem List   Diagnosis Code    Prostate CA ([facility] C61    CAD (coronary artery disease) I25.10    Tinnitus H93.19    Statin intolerance Z78.9    Urethral stricture [id number]    Essential hypertension I10    Insomnia G47.00    Cognitive decline R41.89    CVA, old, cognitive deficits I69.319    ACP (advance care planning) Z71.89    Paroxysmal atrial fibrillation (HCC) I48.0    Chronic back pain M54.9, G89.29       Depression Risk Factor Screening:     3 most recent PHQ Screens 2/19/2019   Little interest or pleasure in doing things Not at all   Feeling down, depressed, irritable, or hopeless Not at all   Total Score PHQ 2 0     Alcohol Risk Factor Screening:       Functional Ability and Level of Safety:   Hearing Loss  The patient wears hearing aids. The patient needs further evaluation. Activities of Daily Living  The home contains: no safety equipment. Patient does total self care    Fall Risk  Fall Risk Assessment, last 12 mths 8/20/2019   Able to walk? Yes   Fall in past 12 months? Yes   Fall with injury?  No   Number of falls in past 12 months 3   Fall Risk Score 3       Abuse Screen  Patient is not abused    Cognitive Screening   Evaluation of Cognitive Function:  Has your family/caregiver stated any concerns about your memory: no  Normal    Patient Care Team   Patient Care Team:  Arron Doyle MD as PCP - General (Internal Medicine)  Leny Paula MD (Urology)  Suman Hernandez MD (Cardiology)  Pascual Lockhart MD (Otolaryngology)  Garrett Persaud MD (Ophthalmology)    Assessment/Plan   Education and counseling provided:  Are appropriate based on today's review and evaluation    Diagnoses and all orders for this visit:    1. Medicare annual wellness visit, subsequent  -     varicella-zoster recombinant, PF, (SHINGRIX, PF,) 50 mcg/0.5 mL susr injection; 0.5mL by IntraMUSCular route once now and then repeat in 2-6 months    2. Screening for depression  -     [place] Maintenance Due   Topic Date Due    Shingrix Vaccine Age 49> (1 of 2) 01/08/1984    DTaP/Tdap/Td series (1 - Tdap) 01/02/2011    Pneumococcal 65+ years (2 of 2 - PCV13) 01/01/2012    GLAUCOMA SCREENING Q2Y  02/16/2019    Influenza Age 5 to Adult  08/01/2019    MEDICARE YEARLY EXAM  08/21/2019       Subjective:   Frances Fenton is a 80 y.o. male  here for follow up of chronic medical conditions listed   Patient Active Problem List    Diagnosis Date Noted    Chronic back pain 02/19/2019    Paroxysmal atrial fibrillation ([facility] 08/20/2018    ACP (advance care planning) 07/21/2017    CVA, old, cognitive deficits 02/13/2017    Cognitive decline 09/02/2016    Urethral stricture 03/02/2016    Essential hypertension 03/02/2016    Insomnia 03/02/2016    Statin intolerance 02/11/2016    Prostate CA ([facility] 08/31/2015    CAD (coronary artery disease) 08/31/2015    Tinnitus 08/31/2015     Current Outpatient Medications   Medication Sig Dispense Refill    varicella-zoster recombinant, PF, (SHINGRIX, PF,) 50 mcg/0.5 mL susr injection 0.5mL by IntraMUSCular route once now and then repeat in 2-6 months 0.5 mL 1    famotidine (PEPCID) 20 mg tablet Take 1 Tab by mouth two (2) times daily as needed for Other (reflux). 60 Tab 5    FINASTERIDE PO Take  by mouth.  B.infantis-B.ani-B.long-B.bifi (PROBIOTIC 4X) 10-15 mg TbEC Take  by mouth.  cyanocobalamin, vitamin B-12, (B-12 DOTS PO) Take  by mouth.  BIOTIN PO Take  by mouth.  cholecalciferol, vitamin D3, (VITAMIN D3 PO) Take  by mouth.  ketoconazole (NIZORAL) 2 % shampoo Apply  to affected area daily as needed for Itching.       fluocinoNIDE (LIDEX) 0.05 % external solution Apply  to affected area two (2) times a day.  tamsulosin (FLOMAX) 0.4 mg capsule Take 1 Cap by mouth daily. 90 Cap 1    apixaban (ELIQUIS) 5 mg tablet Take 5 mg by mouth two (2) times a day.  sotalol (BETAPACE) 80 mg tablet Take 40 mg by mouth two (2) times a day.  traZODone (DESYREL) 50 mg tablet Take 1 Tab by mouth nightly. 90 Tab 1    simethicone 180 mg cap Take 1 Tab by mouth daily as needed. Past Medical History:   Diagnosis Date    Hypertension     Tinnitus      Past Surgical History:   Procedure Laterality Date    HX LITHOTRIPSY      HX PROSTATECTOMY  ~2012    prostatectomy    HX UROLOGICAL  2016    patient not sure of name of procedure, completed by Dr. Rita Mckeon  2010    prostatectomy   . He  is accompanied by patient and spouse. He lives as follows: a spouse and does have Advanced Directives. New Complaints today include: Irregular Heart Beat; Coronary Artery Disease; Arthritis (ra); Cholesterol Problem; and Other (hx of prostate ca)       Recent History includes: tinnitus and worsening hearing    Review of Systems  Ears, nose, mouth, throat, and face: positive for tinnitus  Respiratory: negative  Cardiovascular: negative for irregular heart beats, near-syncope, syncope  Gastrointestinal: negative  Genitourinary:negative  Integument/breast: positive for scalp irritation reviewed derm Therapy  Musculoskeletal:positive for arthralgias, stiff joints, neck pain and back pain  Neurological: positive for cognitive decline    Geriatric Issues: Activities of Daily Living (ADLs):    He is independent in the following: ambulation, bathing and hygiene, feeding, continence, grooming, toileting and dressing  Requires assistance with the following: none  Cognative status:  mild improvement    Outside reports reviewed: office notes.            Objective:     Visit Vitals  /74   Pulse (!) 55   Ht 5' 10\" (1.778 m)   Wt 148 lb (67.1 kg)   SpO2 98%   BMI 21.24 kg/m²     Visit Vitals  /74   Pulse (!) 55   Ht 5' 10\" (1.778 m)   Wt 148 lb (67.1 kg)   SpO2 98%   BMI 21.24 kg/m²     General appearance: alert, cooperative, no distress, appears stated age  Ears: wax  Neck: supple, symmetrical, trachea midline, no adenopathy, thyroid: not enlarged, symmetric, no tenderness/mass/nodules, no carotid bruit and no JVD  Chest wall: no tenderness  Heart: regular rate and rhythm, S1, S2 normal, no murmur, click, rub or gallop  Abdomen: soft, non-tender. Bowel sounds normal. No masses,  no organomegaly  Skin: scale gone scalp  Has hair loss and has bumpy feel   And lots of discoloration    Imaging      Lab Review  Lab Results   Component Value Date/Time    Cholesterol, total 226 (H) 02/19/2019 11:51 AM    HDL Cholesterol 50 02/19/2019 11:51 AM    LDL, calculated 154 (H) 02/19/2019 11:51 AM    LDL-C, External 147 05/27/2015    Triglyceride 110 02/19/2019 11:51 AM     Lab Results   Component Value Date/Time    GFR est non-AA 84 02/19/2019 11:51 AM    GFRNA, POC >60 04/08/2013 03:59 PM    GFR est AA 97 02/19/2019 11:51 AM    GFRAA, POC >60 04/08/2013 03:59 PM    Creatinine 0.75 (L) 02/19/2019 11:51 AM    Creatinine (POC) 0.9 04/08/2013 03:59 PM    BUN 14 02/19/2019 11:51 AM    Sodium 143 02/19/2019 11:51 AM    Potassium 4.6 02/19/2019 11:51 AM    Chloride 102 02/19/2019 11:51 AM    CO2 24 02/19/2019 11:51 AM    Magnesium 2.3 02/11/2016 09:14 AM        Assessment/Plan:   Diagnoses and all orders for this visit:    1. Medicare annual wellness visit, subsequent  -     varicella-zoster recombinant, PF, (SHINGRIX, PF,) 50 mcg/0.5 mL susr injection; 0.5mL by IntraMUSCular route once now and then repeat in 2-6 months    2. Screening for depression  -     DEPRESSION SCREEN ANNUAL    Other orders  -     famotidine (PEPCID) 20 mg tablet; Take 1 Tab by mouth two (2) times daily as needed for Other (reflux). Subjective:     Maru Rowley is a 80 y.o. male who presents for evaluation of a plugged ear. He has noticed bilateral symptoms 2 month ago. There is a prior history of cerumen impaction. Patient denies ear pain. Patient has hearing loss. Objective:     Visit Vitals  /74   Pulse (!) 55   Ht 5' 10\" (1.778 m)   Wt 148 lb (67.1 kg)   SpO2 98%   BMI 21.24 kg/m²       General: alert, cooperative, no distress   Right Ear: ceruminosis noted, cerumen removed. Left Ear:  ceruminosis noted, cerumen removed. After removal: bilateral TM's and external ear canals normal, cerumen removed. Oropharynx:   normal.   Neck:  supple, symmetrical, trachea midline and no adenopathy. Assessment/Plan:     Cerumen Impaction, without otitis externa. 1. Cerumen removed by flushing. 2. Care instructions given. 3. Home treatment: none  4. Followup as needed. Diagnoses and all orders for this visit:    1. Bilateral impacted cerumen  -     REMOVE IMPACTED EAR WAX    2. Medicare annual wellness visit, subsequent  -     varicella-zoster recombinant, PF, (SHINGRIX, PF,) 50 mcg/0.5 mL susr injection; 0.5mL by IntraMUSCular route once now and then repeat in 2-6 months    3. Screening for depression  -     DEPRESSION SCREEN ANNUAL    Other orders  -     famotidine (PEPCID) 20 mg tablet; Take 1 Tab by mouth two (2) times daily as needed for Other (reflux). .1. Medicare annual wellness visit, subsequent    - varicella-zoster recombinant, PF, (SHINGRIX, PF,) 50 mcg/0.5 mL susr injection; 0.5mL by IntraMUSCular route once now and then repeat in 2-6 months  Dispense: 0.5 mL; Refill: 1    2. Screening for depression    - DEPRESSION SCREEN ANNUAL    3. Bilateral impacted cerumen    - REMOVE IMPACTED EAR WAX    4. Paroxysmal atrial fibrillation (HCC)  Controlled on noac    5. Essential hypertension  Control good    6.  Tinnitus of both ears  Worse advise see audiology again now

## 2019-09-11 ENCOUNTER — OFFICE VISIT (OUTPATIENT)
Dept: INTERNAL MEDICINE CLINIC | Age: 84
End: 2019-09-11

## 2019-09-11 VITALS
WEIGHT: 148 LBS | TEMPERATURE: 96.2 F | BODY MASS INDEX: 21.19 KG/M2 | SYSTOLIC BLOOD PRESSURE: 140 MMHG | OXYGEN SATURATION: 98 % | HEIGHT: 70 IN | RESPIRATION RATE: 18 BRPM | HEART RATE: 66 BPM | DIASTOLIC BLOOD PRESSURE: 68 MMHG

## 2019-09-11 DIAGNOSIS — Z79.01 CURRENT USE OF LONG TERM ANTICOAGULATION: ICD-10-CM

## 2019-09-11 DIAGNOSIS — K62.5 RECTAL BLEED: Primary | ICD-10-CM

## 2019-09-11 NOTE — PROGRESS NOTES
1. Have you been to the ER, urgent care clinic since your last visit? Hospitalized since your last visit? No    2. Have you seen or consulted any other health care providers outside of the [address] [place] [address] since your last visit? Include any pap smears or colon screening.  No

## 2019-09-11 NOTE — PROGRESS NOTES
Subjective:      Lito Palma is an 80 y.o. male who was self-referred for evaluation of rectal bleeding. He has experienced some intermitten bleeding occurring 2 times per week. Bleeding is described as blood streaking outside of stool. Patient is having loose bowels for a few days. Patient denies a personal history of colon cancer or IBD. last colonoscopy age [de-identified]    Patient Active Problem List    Diagnosis Date Noted    Chronic back pain 02/19/2019    Paroxysmal atrial fibrillation ([facility] 08/20/2018    ACP (advance care planning) 07/21/2017    CVA, old, cognitive deficits 02/13/2017    Cognitive decline 09/02/2016    Urethral stricture 03/02/2016    Essential hypertension 03/02/2016    Insomnia 03/02/2016    Statin intolerance 02/11/2016    Prostate CA ([facility] 08/31/2015    CAD (coronary artery disease) 08/31/2015    Tinnitus 08/31/2015        Review of Systems  A comprehensive review of systems was negative except for that written in the HPI. No antibiotic      Narrow stools  Objective:     General:  alert, cooperative, no distress, appears stated age   Abdomen: soft, nontender, nondistended, no masses or organomegaly. Rectal:   not performed     Assessment/Plan:     Diagnoses and all orders for this visit:    1. Rectal bleed  -     REFERRAL TO GASTROENTEROLOGY      Will add benefiber and watch as no bleeding for 2 days  1. Discussed the options of observation, medical management and surgery. I discussed  in detail and the complications related to the operation and the anesthesia. The patient understands the risks, any and all questions were answered to the patient's satisfaction. Diagnoses and all orders for this visit:    1. Rectal bleed  -     REFERRAL TO GASTROENTEROLOGY    he  Seems better  Will add benefiber if persists then see GI      Diagnoses and all orders for this visit:    1.  Rectal bleed  -     REFERRAL TO GASTROENTEROLOGY    2. Current use of long term anticoagulation

## 2019-11-21 RX ORDER — TRAZODONE HYDROCHLORIDE 50 MG/1
TABLET ORAL
Qty: 90 TAB | Refills: 1 | Status: SHIPPED | OUTPATIENT
Start: 2019-11-21 | End: 2020-02-19 | Stop reason: SDUPTHER

## 2020-02-19 ENCOUNTER — OFFICE VISIT (OUTPATIENT)
Dept: INTERNAL MEDICINE CLINIC | Age: 85
End: 2020-02-19

## 2020-02-19 VITALS
SYSTOLIC BLOOD PRESSURE: 129 MMHG | DIASTOLIC BLOOD PRESSURE: 65 MMHG | HEART RATE: 62 BPM | HEIGHT: 70 IN | RESPIRATION RATE: 18 BRPM | WEIGHT: 148 LBS | BODY MASS INDEX: 21.19 KG/M2 | OXYGEN SATURATION: 98 %

## 2020-02-19 DIAGNOSIS — C61 PROSTATE CA (HCC): ICD-10-CM

## 2020-02-19 DIAGNOSIS — M19.90 INFLAMMATORY OSTEOARTHRITIS: Primary | ICD-10-CM

## 2020-02-19 DIAGNOSIS — I48.0 PAROXYSMAL ATRIAL FIBRILLATION (HCC): ICD-10-CM

## 2020-02-19 RX ORDER — PREDNISONE 5 MG/1
5 TABLET ORAL DAILY
Qty: 30 TAB | Refills: 0 | Status: SHIPPED | OUTPATIENT
Start: 2020-02-19 | End: 2020-03-13

## 2020-02-19 NOTE — PROGRESS NOTES
1. Have you been to the ER, urgent care clinic since your last visit? Hospitalized since your last visit? No    2. Have you seen or consulted any other health care providers outside of the [address] [place] [address] since your last visit? Include any pap smears or colon screening. Yes.   Urology-dr smith, rheumatology-dr sousa

## 2020-02-19 NOTE — PROGRESS NOTES
[place] [facility],  Off prednisone for a week  And flairing up    Saw Hoa at [facility]      Subjective:  Due to a lot of hand pain he was seen by Dr. Gina Beaver, [facility] orthopedics hand specialist.  He referred him to rheumatology and he saw a Dr. Karon Singh and he saw a nurse practitioner in the rheumatology division named Mirna Avina. They put him on Methotrexate. I understand that his testing showed serologically negative disease. He seemed to have some inflammatory arthritis in his hands. He had no evidence of psoriasis  But hand erosions on xray and the Methotrexate dose was started low and he has now titrated up to 5 weekly, has been on it for two months. They told him to stop his 5 mg of prednisone when they saw him 3 1/2 weeks ago and he has flared up. He comes in today to show me. Physical Examination: BP was normal.  He was alert. He had inflammatory changes in his DIP joints in all five fingers. No skin rash, no ulcerations. No PIP or MCP joint inflammation. Wrists were normal, elbows were normal.  There were no skin nodules. 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 I will try to contact the nurse practitioner or the attending at [facility] to tell them why I was doing that and I recommend that he be seen sooner by them rather than later. Vitals:    02/19/20 1018   BP: 129/65   Pulse: 62   Resp: 18   SpO2: 98%   Weight: 148 lb (67.1 kg)   Height: 5' 10\" (1.778 m)     1. Inflammatory osteoarthritis  Add prednisone 5 mg for 30 days    2. Paroxysmal atrial fibrillation (HCC)  On   anticoag    3.  Prostate CA ([address]  remission

## 2020-03-13 RX ORDER — PREDNISONE 5 MG/1
TABLET ORAL
Qty: 30 TAB | Refills: 0 | Status: SHIPPED | OUTPATIENT
Start: 2020-03-13 | End: 2020-09-21 | Stop reason: SDUPTHER

## 2020-03-30 PROBLEM — Z85.46 HISTORY OF PROSTATE CANCER: Status: ACTIVE | Noted: 2020-03-30

## 2020-05-24 DIAGNOSIS — G47.00 INSOMNIA, UNSPECIFIED TYPE: ICD-10-CM

## 2020-05-25 RX ORDER — TRAZODONE HYDROCHLORIDE 50 MG/1
50 TABLET ORAL
Qty: 90 TAB | Refills: 1 | Status: SHIPPED | OUTPATIENT
Start: 2020-05-25 | End: 2020-12-06

## 2020-09-14 ENCOUNTER — TELEPHONE (OUTPATIENT)
Dept: INTERNAL MEDICINE CLINIC | Age: 85
End: 2020-09-14

## 2020-09-14 NOTE — TELEPHONE ENCOUNTER
Patient wife is calling pt has arthritis in his finger tips and has worked itself down into his finger nails, Can be patient be referred to someone who specializes in this please advise [phone]

## 2020-09-21 ENCOUNTER — OFFICE VISIT (OUTPATIENT)
Dept: INTERNAL MEDICINE CLINIC | Age: 85
End: 2020-09-21
Payer: MEDICARE

## 2020-09-21 VITALS
HEIGHT: 70 IN | DIASTOLIC BLOOD PRESSURE: 75 MMHG | BODY MASS INDEX: 21.05 KG/M2 | HEART RATE: 68 BPM | RESPIRATION RATE: 16 BRPM | WEIGHT: 147 LBS | TEMPERATURE: 94.9 F | OXYGEN SATURATION: 96 % | SYSTOLIC BLOOD PRESSURE: 136 MMHG

## 2020-09-21 DIAGNOSIS — M19.90 INFLAMMATORY OSTEOARTHRITIS: Primary | ICD-10-CM

## 2020-09-21 PROCEDURE — G8536 NO DOC ELDER MAL SCRN: HCPCS | Performed by: INTERNAL MEDICINE

## 2020-09-21 PROCEDURE — 99213 OFFICE O/P EST LOW 20 MIN: CPT | Performed by: INTERNAL MEDICINE

## 2020-09-21 PROCEDURE — G8510 SCR DEP NEG, NO PLAN REQD: HCPCS | Performed by: INTERNAL MEDICINE

## 2020-09-21 PROCEDURE — G8427 DOCREV CUR MEDS BY ELIG CLIN: HCPCS | Performed by: INTERNAL MEDICINE

## 2020-09-21 PROCEDURE — 1101F PT FALLS ASSESS-DOCD LE1/YR: CPT | Performed by: INTERNAL MEDICINE

## 2020-09-21 PROCEDURE — G8420 CALC BMI NORM PARAMETERS: HCPCS | Performed by: INTERNAL MEDICINE

## 2020-09-21 PROCEDURE — G0463 HOSPITAL OUTPT CLINIC VISIT: HCPCS | Performed by: INTERNAL MEDICINE

## 2020-09-21 RX ORDER — SULFASALAZINE 500 MG/1
500 TABLET ORAL 2 TIMES DAILY
COMMUNITY

## 2020-09-21 RX ORDER — DICLOFENAC SODIUM 10 MG/G
GEL TOPICAL 4 TIMES DAILY
COMMUNITY

## 2020-09-21 RX ORDER — PREDNISONE 5 MG/1
5 TABLET ORAL DAILY
Qty: 21 TAB | Refills: 0 | Status: SHIPPED | OUTPATIENT
Start: 2020-09-21 | End: 2020-10-12

## 2020-12-04 DIAGNOSIS — G47.00 INSOMNIA, UNSPECIFIED TYPE: ICD-10-CM

## 2020-12-06 RX ORDER — TRAZODONE HYDROCHLORIDE 50 MG/1
TABLET ORAL
Qty: 90 TAB | Refills: 1 | Status: SHIPPED | OUTPATIENT
Start: 2020-12-06 | End: 2021-05-29

## 2021-01-12 ENCOUNTER — OFFICE VISIT (OUTPATIENT)
Dept: INTERNAL MEDICINE CLINIC | Age: 86
End: 2021-01-12
Payer: MEDICARE

## 2021-01-12 VITALS
TEMPERATURE: 97 F | HEIGHT: 70 IN | SYSTOLIC BLOOD PRESSURE: 110 MMHG | RESPIRATION RATE: 16 BRPM | DIASTOLIC BLOOD PRESSURE: 65 MMHG | HEART RATE: 50 BPM | OXYGEN SATURATION: 96 % | WEIGHT: 150 LBS | BODY MASS INDEX: 21.47 KG/M2

## 2021-01-12 DIAGNOSIS — H61.22 EXCESSIVE CERUMEN IN EAR CANAL, LEFT: Primary | ICD-10-CM

## 2021-01-12 DIAGNOSIS — Z00.00 MEDICARE ANNUAL WELLNESS VISIT, SUBSEQUENT: ICD-10-CM

## 2021-01-12 PROCEDURE — 99212 OFFICE O/P EST SF 10 MIN: CPT | Performed by: INTERNAL MEDICINE

## 2021-01-12 PROCEDURE — G8510 SCR DEP NEG, NO PLAN REQD: HCPCS | Performed by: INTERNAL MEDICINE

## 2021-01-12 PROCEDURE — G0439 PPPS, SUBSEQ VISIT: HCPCS | Performed by: INTERNAL MEDICINE

## 2021-01-12 PROCEDURE — G8536 NO DOC ELDER MAL SCRN: HCPCS | Performed by: INTERNAL MEDICINE

## 2021-01-12 PROCEDURE — 1101F PT FALLS ASSESS-DOCD LE1/YR: CPT | Performed by: INTERNAL MEDICINE

## 2021-01-12 PROCEDURE — G8427 DOCREV CUR MEDS BY ELIG CLIN: HCPCS | Performed by: INTERNAL MEDICINE

## 2021-01-12 PROCEDURE — G0463 HOSPITAL OUTPT CLINIC VISIT: HCPCS | Performed by: INTERNAL MEDICINE

## 2021-01-12 PROCEDURE — G8420 CALC BMI NORM PARAMETERS: HCPCS | Performed by: INTERNAL MEDICINE

## 2021-01-12 NOTE — PATIENT INSTRUCTIONS
Medicare Wellness Visit, Male The best way to live healthy is to have a lifestyle where you eat a well-balanced diet, exercise regularly, limit alcohol use, and quit all forms of tobacco/nicotine, if applicable. Regular preventive services are another way to keep healthy. Preventive services (vaccines, screening tests, monitoring & exams) can help personalize your care plan, which helps you manage your own care. Screening tests can find health problems at the earliest stages, when they are easiest to treat. [facility] follows the current, evidence-based guidelines published by the [facility] ([facility]) when recommending preventive services for our patients. Because we follow these guidelines, sometimes recommendations change over time as research supports it. (For example, a prostate screening blood test is no longer routinely recommended for men with no symptoms). Of course, you and your doctor may decide to screen more often for some diseases, based on your risk and co-morbidities (chronic disease you are already diagnosed with). Preventive services for you include: - Medicare offers their members a free annual wellness visit, which is time for you and your primary care provider to discuss and plan for your preventive service needs. Take advantage of this benefit every year! 
-All adults over age 72 should receive the recommended pneumonia vaccines. Current USPSTF guidelines recommend a series of two vaccines for the best pneumonia protection.  
-All adults should have a flu vaccine yearly and tetanus vaccine every 10 years. 
-All adults age 48 and older should receive the shingles vaccines (series of two vaccines). -All adults age 38-68 who are overweight should have a diabetes screening test once every three years. -Other screening tests & preventive services for persons with diabetes include: an eye exam to screen for diabetic retinopathy, a kidney function test, a foot exam, and stricter control over your cholesterol.  
-Cardiovascular screening for adults with routine risk involves an electrocardiogram (ECG) at intervals determined by the provider.  
-Colorectal cancer screening should be done for adults age 54-65 with no increased risk factors for colorectal cancer. There are a number of acceptable methods of screening for this type of cancer. Each test has its own benefits and drawbacks. Discuss with your provider what is most appropriate for you during your annual wellness visit. The different tests include: colonoscopy (considered the best screening method), a fecal occult blood test, a fecal DNA test, and sigmoidoscopy. 
-All adults born between [facility] should be screened once for Hepatitis C. 
-An Abdominal Aortic Aneurysm (AAA) Screening is recommended for men age 73-68 who has ever smoked in their lifetime. Here is a list of your current Health Maintenance items (your personalized list of preventive services) with a due date: 
Health Maintenance Due Topic Date Due  
 DTaP/Tdap/Td  (1 - Tdap) 01/08/1955  Shingles Vaccine (1 of 2) 01/08/1984  Glaucoma Screening   02/16/2019  Yearly Flu Vaccine (1) 09/01/2020

## 2021-01-12 NOTE — PROGRESS NOTES
Chief Complaint   Patient presents with    Follow-up   \1. Have you been to the ER, urgent care clinic since your last visit? Hospitalized since your last visit? No    2. Have you seen or consulted any other health care providers outside of the [address] [place] [address] since your last visit? Include any pap smears or colon screening.  Yes

## 2021-01-12 NOTE — PROGRESS NOTES
Left earwax is bothersome wears aides bilat  Explained we cannot flush during covid      Left canal with soft wax      Advise soak cotton ball in mineral oil and once a week do this  This is the Subsequent Medicare Annual Wellness Exam, performed 12 months or more after the Initial AWV or the last Subsequent AWV    I have reviewed the patient's medical history in detail and updated the computerized patient record. Depression Risk Factor Screening:     3 most recent PHQ Screens 1/12/2021   Little interest or pleasure in doing things Not at all   Feeling down, depressed, irritable, or hopeless Not at all   Total Score PHQ 2 0       Alcohol Risk Screen    Do you average more than 1 drink per night or more than 7 drinks a week: In the past three months have you have had more than 4 drinks containing alcohol on one occasion:         Functional Ability and Level of Safety:    Hearing: The patient wears hearing aids. Activities of Daily Living: The home contains: no safety equipment. Patient does total self care      Ambulation: with no difficulty     Fall Risk:  Fall Risk Assessment, last 12 mths 1/12/2021   Able to walk? Yes   Fall in past 12 months? 0   Do you feel unsteady? 0   Are you worried about falling 0   Number of falls in past 12 months -   Fall with injury? -      Abuse Screen:  Patient is not abused       Cognitive Screening    Has your family/caregiver stated any concerns about your memory: no     Cognitive Screening: Normal - Verbal Fluency Test    Assessment/Plan   Education and counseling provided:  Are appropriate based on today's review and evaluation    Diagnoses and all orders for this visit:    1. Excessive cerumen in ear canal, left    2.  Medicare annual wellness visit, subsequent        Health Maintenance Due     Health Maintenance Due   Topic Date Due    DTaP/Tdap/Td series (1 - Tdap) 01/08/1955    Shingrix Vaccine Age 50> (1 of 2) 01/08/1984    GLAUCOMA SCREENING Q2Y  02/16/2019  Flu Vaccine (1) 09/01/2020       Patient Care Team   Patient Care Team:  Adele Kumar MD as PCP - General (Internal Medicine)  Adele Kumar MD as PCP - [address] [place] [address]  [facility] Provider  Rush Parada MD (Urology)  Jose Alberto Joseph MD (Cardiology)  Tommy Longoria MD (Otolaryngology)  Daly Mota MD (Ophthalmology)    History     Patient Active Problem List   Diagnosis Code    CAD (coronary artery disease) I25.10    Tinnitus H93.19    Statin intolerance Z78.9    Urethral stricture N35.919    Essential hypertension I10    Insomnia G47.00    Cognitive decline R41.89    CVA, old, 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 Nura Meléndez  2010    prostatectomy     Current Outpatient Medications   Medication Sig Dispense Refill    traZODone (DESYREL) 50 mg tablet TAKE 1 TABLET BY MOUTH EVERY DAY AT NIGHT 90 Tab 1    sulfaSALAzine (AZULFIDINE) 500 mg tablet Take 500 mg by mouth two (2) times a day.  diclofenac (VOLTAREN) 1 % gel Apply  to affected area four (4) times daily.  METHOTREXATE PO Take  by mouth.  famotidine (PEPCID) 20 mg tablet Take 1 Tab by mouth two (2) times daily as needed for Other (reflux). 60 Tab 5    FINASTERIDE PO Take  by mouth.  B.infantis-B.ani-B.long-B.bifi (PROBIOTIC 4X) 10-15 mg TbEC Take  by mouth.  cyanocobalamin, vitamin B-12, (B-12 DOTS PO) Take  by mouth.  BIOTIN PO Take  by mouth.       cholecalciferol, vitamin D3, (VITAMIN D3 PO) Take  by mouth.  ketoconazole (NIZORAL) 2 % shampoo Apply  to affected area daily as needed for Itching.  fluocinoNIDE (LIDEX) 0.05 % external solution Apply  to affected area two (2) times a day.  tamsulosin (FLOMAX) 0.4 mg capsule Take 1 Cap by mouth daily. 90 Cap 1    apixaban (ELIQUIS) 5 mg tablet Take 5 mg by mouth two (2) times a day.  sotalol (BETAPACE) 80 mg tablet Take 40 mg by mouth two (2) times a day. Allergies   Allergen Reactions    Statins-Hmg-Coa Reductase Inhibitors Myalgia       Family History   Problem Relation Age of Onset    No Known Problems Mother     Heart Disease Father     Lung Disease Father      Social History     Tobacco Use    Smoking status: Former Smoker     Years: 5.00    Smokeless tobacco: Never Used   Substance Use Topics    Alcohol use:  Yes     Alcohol/week: 1.0 standard drinks     Types: 1 Glasses of wine per week     Comment: nightly

## 2021-03-04 ENCOUNTER — OFFICE VISIT (OUTPATIENT)
Dept: INTERNAL MEDICINE CLINIC | Age: 86
End: 2021-03-04
Payer: MEDICARE

## 2021-03-04 VITALS
HEIGHT: 70 IN | DIASTOLIC BLOOD PRESSURE: 56 MMHG | HEART RATE: 66 BPM | RESPIRATION RATE: 18 BRPM | TEMPERATURE: 98.7 F | SYSTOLIC BLOOD PRESSURE: 130 MMHG | BODY MASS INDEX: 21.52 KG/M2

## 2021-03-04 DIAGNOSIS — M51.16 LUMBAR DISC DISEASE WITH RADICULOPATHY: Primary | ICD-10-CM

## 2021-03-04 DIAGNOSIS — R63.4 WEIGHT LOSS: ICD-10-CM

## 2021-03-04 DIAGNOSIS — I10 ESSENTIAL HYPERTENSION: ICD-10-CM

## 2021-03-04 PROCEDURE — G8427 DOCREV CUR MEDS BY ELIG CLIN: HCPCS | Performed by: INTERNAL MEDICINE

## 2021-03-04 PROCEDURE — 99214 OFFICE O/P EST MOD 30 MIN: CPT | Performed by: INTERNAL MEDICINE

## 2021-03-04 PROCEDURE — G0463 HOSPITAL OUTPT CLINIC VISIT: HCPCS | Performed by: INTERNAL MEDICINE

## 2021-03-04 PROCEDURE — G8536 NO DOC ELDER MAL SCRN: HCPCS | Performed by: INTERNAL MEDICINE

## 2021-03-04 PROCEDURE — G8510 SCR DEP NEG, NO PLAN REQD: HCPCS | Performed by: INTERNAL MEDICINE

## 2021-03-04 PROCEDURE — G8420 CALC BMI NORM PARAMETERS: HCPCS | Performed by: INTERNAL MEDICINE

## 2021-03-04 PROCEDURE — 1101F PT FALLS ASSESS-DOCD LE1/YR: CPT | Performed by: INTERNAL MEDICINE

## 2021-03-04 RX ORDER — HYDROCODONE BITARTRATE AND ACETAMINOPHEN 5; 325 MG/1; MG/1
1 TABLET ORAL
COMMUNITY
Start: 2021-01-22 | End: 2022-04-21 | Stop reason: ALTCHOICE

## 2021-03-04 RX ORDER — LEFLUNOMIDE 20 MG/1
TABLET ORAL
COMMUNITY
Start: 2020-11-24

## 2021-03-04 RX ORDER — DULOXETIN HYDROCHLORIDE 20 MG/1
CAPSULE, DELAYED RELEASE ORAL
COMMUNITY
Start: 2021-02-26

## 2021-03-04 RX ORDER — TAMSULOSIN HYDROCHLORIDE 0.4 MG/1
0.4 CAPSULE ORAL 2 TIMES DAILY
COMMUNITY
Start: 2021-02-14

## 2021-03-04 RX ORDER — CYCLOBENZAPRINE HCL 5 MG
5 TABLET ORAL
COMMUNITY
Start: 2021-03-04 | End: 2021-03-14

## 2021-03-04 NOTE — PROGRESS NOTES
80 y.o. male who is followed by the Rheumatology Clinic at [facility] for psoriatic arthritis with severe PIP and DIP hand arthritis. That has improved. He has had rather sudden onset of significant low back pain radiating into his buttocks. He was seen by a spine surgeon at [address] [place] [address] who I do not know, do not really have records. They have given him two epidurals. He says he has not had an MRI. The epidurals have been unsuccessful. His rheumatologist at [facility] has done some plain x-rays. I do not have copies of that. The spine surgeon told him that he is a candidate for back surgery. They were scheduling him for an MRI and then they were going to talk more about it. He has not been sent for PT. He is 80years old, has some general debility. Up until a few weeks ago, he was ambulatory and moving around well. Now, he says he has to lean forward and use his Rollator walker because it hurts to stand straight. He gets radiation into his buttock. He denies loss of sensation of his perineum, and he denies specifically bowel or bladder incontinence, and he denies numbness of the legs. On exam, he has got lower back tenderness but not exquisite. No rash. He has got leg strength in his calves and thigh muscles. His toes point to the ceiling. Nothing focal.  He is alert and oriented, and he was down a couple of pounds. According to the wife, he has also been somewhat depressed and upset over some family, serious stress, which has contributed to his weight loss.     Patient Active Problem List    Diagnosis    History of prostate cancer    Current use of long term anticoagulation    Chronic back pain    Paroxysmal atrial fibrillation (HCC)    ACP (advance care planning)    CVA, old, cognitive deficits    Cognitive decline    Urethral stricture    Essential hypertension    Insomnia    Statin intolerance    CAD (coronary artery disease)     Stent X 3  2004    Nuclear stress test in Sept 2015 NL:     [facility] Tinnitus       Vitals:    03/04/21 1459   BP: (!) 130/56   Pulse: 66   Resp: 18   Temp: 98.7 °F (37.1 °C)   TempSrc: Temporal   Height: 5' 10\" (1.778 m)     Wt Readings from Last 3 Encounters:   01/12/21 150 lb (68 kg)   09/21/20 147 lb (66.7 kg)   02/19/20 148 lb (67.1 kg)     1. Lumbar disc disease with radiculopathy  PT needed   Discouraged  Surgery will review rheumatology notes  [facility]  Will [facility] PT  - REFERRAL TO PHYSICAL THERAPY    2. Essential hypertension  controlled    3.  Weight loss  From stress I suspect  Prolonged visit with 20 minutes of time out  of more than a  35 minute visit spent counseling patient and family and formulation of care  Have reviewed xrays from [facility] today and left message with patient about these results

## 2021-03-04 NOTE — PROGRESS NOTES
Chief Complaint   Patient presents with    Weight Loss    Coronary Artery Disease    Cerebrovascular Accident    Blood Pressure Check         1. Have you been to the ER, urgent care clinic since your last visit? Hospitalized since your last visit? No    2. Have you seen or consulted any other health care providers outside of the [address] [place] [address] since your last visit? Include any pap smears or colon screening.  No

## 2021-03-09 ENCOUNTER — TELEPHONE (OUTPATIENT)
Dept: INTERNAL MEDICINE CLINIC | Age: 86
End: 2021-03-09

## 2021-03-09 NOTE — TELEPHONE ENCOUNTER
Norma Arshad, [facility]       Reason for call: Callback       Callback required yes/no and why: yes       Best contact number(s): [phone]       Details to clarify the request: Theo Robles from [address] [place] [address] called, would like a callback regarding patient       [facility]

## 2021-03-25 ENCOUNTER — TRANSCRIBE ORDER (OUTPATIENT)
Dept: SCHEDULING | Age: 86
End: 2021-03-25

## 2021-03-25 DIAGNOSIS — M48.061 SPINAL STENOSIS, LUMBAR REGION, WITHOUT NEUROGENIC CLAUDICATION: Primary | ICD-10-CM

## 2021-05-17 ENCOUNTER — TELEPHONE (OUTPATIENT)
Dept: INTERNAL MEDICINE CLINIC | Age: 86
End: 2021-05-17

## 2021-05-29 DIAGNOSIS — G47.00 INSOMNIA, UNSPECIFIED TYPE: ICD-10-CM

## 2021-05-29 RX ORDER — TRAZODONE HYDROCHLORIDE 50 MG/1
TABLET ORAL
Qty: 90 TABLET | Refills: 1 | Status: SHIPPED | OUTPATIENT
Start: 2021-05-29 | End: 2021-12-22

## 2021-06-07 ENCOUNTER — TELEPHONE (OUTPATIENT)
Dept: INTERNAL MEDICINE CLINIC | Age: 86
End: 2021-06-07

## 2021-06-07 NOTE — TELEPHONE ENCOUNTER
Germaine Tobar from [facility] home care called with concern about the patient BP left arm 87/41 right arm 110/61 with diarrhea pulse 71

## 2021-06-07 NOTE — TELEPHONE ENCOUNTER
Spoke to [facility] he is dry so more gatorade and water  On no antibiotics can use imodium prn

## 2022-03-18 PROBLEM — Z85.46 HISTORY OF PROSTATE CANCER: Status: ACTIVE | Noted: 2020-03-30

## 2022-03-18 PROBLEM — Z79.01 CURRENT USE OF LONG TERM ANTICOAGULATION: Status: ACTIVE | Noted: 2019-09-11

## 2022-03-18 PROBLEM — I69.319 CVA, OLD, COGNITIVE DEFICITS: Status: ACTIVE | Noted: 2017-02-13

## 2022-03-19 PROBLEM — G89.29 CHRONIC BACK PAIN: Status: ACTIVE | Noted: 2019-02-19

## 2022-03-19 PROBLEM — Z71.89 ACP (ADVANCE CARE PLANNING): Status: ACTIVE | Noted: 2017-07-21

## 2022-03-19 PROBLEM — M54.9 CHRONIC BACK PAIN: Status: ACTIVE | Noted: 2019-02-19

## 2022-03-19 PROBLEM — I48.0 PAROXYSMAL ATRIAL FIBRILLATION (HCC): Status: ACTIVE | Noted: 2018-08-20

## 2022-04-21 ENCOUNTER — OFFICE VISIT (OUTPATIENT)
Dept: INTERNAL MEDICINE CLINIC | Age: 87
End: 2022-04-21
Payer: MEDICARE

## 2022-04-21 VITALS
RESPIRATION RATE: 16 BRPM | WEIGHT: 140.86 LBS | SYSTOLIC BLOOD PRESSURE: 116 MMHG | OXYGEN SATURATION: 96 % | TEMPERATURE: 98.8 F | HEIGHT: 70 IN | DIASTOLIC BLOOD PRESSURE: 70 MMHG | BODY MASS INDEX: 20.16 KG/M2 | HEART RATE: 80 BPM

## 2022-04-21 DIAGNOSIS — G47.00 INSOMNIA, UNSPECIFIED TYPE: ICD-10-CM

## 2022-04-21 DIAGNOSIS — M54.40 CHRONIC LOW BACK PAIN WITH SCIATICA, SCIATICA LATERALITY UNSPECIFIED, UNSPECIFIED BACK PAIN LATERALITY: Primary | ICD-10-CM

## 2022-04-21 DIAGNOSIS — Z00.00 MEDICARE ANNUAL WELLNESS VISIT, SUBSEQUENT: ICD-10-CM

## 2022-04-21 DIAGNOSIS — G89.29 CHRONIC LOW BACK PAIN WITH SCIATICA, SCIATICA LATERALITY UNSPECIFIED, UNSPECIFIED BACK PAIN LATERALITY: Primary | ICD-10-CM

## 2022-04-21 DIAGNOSIS — R26.89 BALANCE DISORDER: ICD-10-CM

## 2022-04-21 DIAGNOSIS — I48.0 PAROXYSMAL ATRIAL FIBRILLATION (HCC): ICD-10-CM

## 2022-04-21 PROCEDURE — G8510 SCR DEP NEG, NO PLAN REQD: HCPCS | Performed by: INTERNAL MEDICINE

## 2022-04-21 PROCEDURE — G8536 NO DOC ELDER MAL SCRN: HCPCS | Performed by: INTERNAL MEDICINE

## 2022-04-21 PROCEDURE — G0463 HOSPITAL OUTPT CLINIC VISIT: HCPCS | Performed by: INTERNAL MEDICINE

## 2022-04-21 PROCEDURE — G8427 DOCREV CUR MEDS BY ELIG CLIN: HCPCS | Performed by: INTERNAL MEDICINE

## 2022-04-21 PROCEDURE — 99214 OFFICE O/P EST MOD 30 MIN: CPT | Performed by: INTERNAL MEDICINE

## 2022-04-21 PROCEDURE — G8420 CALC BMI NORM PARAMETERS: HCPCS | Performed by: INTERNAL MEDICINE

## 2022-04-21 PROCEDURE — G0439 PPPS, SUBSEQ VISIT: HCPCS | Performed by: INTERNAL MEDICINE

## 2022-04-21 PROCEDURE — 1101F PT FALLS ASSESS-DOCD LE1/YR: CPT | Performed by: INTERNAL MEDICINE

## 2022-04-21 RX ORDER — TRAZODONE HYDROCHLORIDE 50 MG/1
50 TABLET ORAL
Qty: 90 TABLET | Refills: 1 | Status: SHIPPED | OUTPATIENT
Start: 2022-04-21 | End: 2022-10-09

## 2022-04-21 NOTE — PROGRESS NOTES
1. Have you been to the ER, urgent care clinic since your last visit? Hospitalized since your last visit? No    2. Have you seen or consulted any other health care providers outside of the [address] [place] [address] since your last visit? Include any pap smears or colon screening.  No     Chief Complaint   Patient presents with    Medication Refill

## 2022-04-24 NOTE — PATIENT INSTRUCTIONS
Medicare Wellness Visit, Male    The best way to live healthy is to have a lifestyle where you eat a well-balanced diet, exercise regularly, limit alcohol use, and quit all forms of tobacco/nicotine, if applicable. Regular preventive services are another way to keep healthy. Preventive services (vaccines, screening tests, monitoring & exams) can help personalize your care plan, which helps you manage your own care. Screening tests can find health problems at the earliest stages, when they are easiest to treat. [facility] follows the current, evidence-based guidelines published by the [facility] ([facility]) when recommending preventive services for our patients. Because we follow these guidelines, sometimes recommendations change over time as research supports it. (For example, a prostate screening blood test is no longer routinely recommended for men with no symptoms). Of course, you and your doctor may decide to screen more often for some diseases, based on your risk and co-morbidities (chronic disease you are already diagnosed with). Preventive services for you include:  - Medicare offers their members a free annual wellness visit, which is time for you and your primary care provider to discuss and plan for your preventive service needs. Take advantage of this benefit every year!  -All adults over age 72 should receive the recommended pneumonia vaccines. Current USPSTF guidelines recommend a series of two vaccines for the best pneumonia protection.   -All adults should have a flu vaccine yearly and tetanus vaccine every 10 years.  -All adults age 48 and older should receive the shingles vaccines (series of two vaccines).        -All adults age 38-68 who are overweight should have a diabetes screening test once every three years.   -Other screening tests & preventive services for persons with diabetes include: an eye exam to screen for diabetic retinopathy, a kidney function test, a foot exam, and stricter control over your cholesterol.   -Cardiovascular screening for adults with routine risk involves an electrocardiogram (ECG) at intervals determined by the provider.   -Colorectal cancer screening should be done for adults age 54-65 with no increased risk factors for colorectal cancer. There are a number of acceptable methods of screening for this type of cancer. Each test has its own benefits and drawbacks. Discuss with your provider what is most appropriate for you during your annual wellness visit. The different tests include: colonoscopy (considered the best screening method), a fecal occult blood test, a fecal DNA test, and sigmoidoscopy.  -All adults born between [facility] should be screened once for Hepatitis C.  -An Abdominal Aortic Aneurysm (AAA) Screening is recommended for men age 73-68 who has ever smoked in their lifetime.      Here is a list of your current Health Maintenance items (your personalized list of preventive services) with a due date:  Health Maintenance Due   Topic Date Due    Shingles Vaccine (1 of 2) Never done    DTaP/Tdap/Td  (1 - Tdap) 01/02/2011    Pneumococcal Vaccine (2 - PCV) 05/22/2020    COVID-19 Vaccine (3 - Booster for Pfizer series) 08/20/2021

## 2022-04-24 NOTE — PROGRESS NOTES
This is the Subsequent Medicare Annual Wellness Exam, performed 12 months or more after the Initial AWV or the last Subsequent AWV    I have reviewed the patient's medical history in detail and updated the computerized patient record. Assessment/Plan   Education and counseling provided:  Are appropriate based on today's review and evaluation    1. Chronic low back pain with sciatica, sciatica laterality unspecified, unspecified back pain laterality  2. Insomnia, unspecified type  -     traZODone (DESYREL) 50 mg tablet; Take 1 Tablet by mouth nightly., Normal, Disp-90 Tablet, R-1  -     REFERRAL TO PHYSICAL THERAPY  3. Paroxysmal atrial fibrillation (HCC)  4. Balance disorder  -     REFERRAL TO PHYSICAL THERAPY  5. Medicare annual wellness visit, subsequent       Depression Risk Factor Screening     3 most recent PHQ Screens 4/21/2022   Little interest or pleasure in doing things Not at all   Feeling down, depressed, irritable, or hopeless Several days   Total Score PHQ 2 1   Trouble falling or staying asleep, or sleeping too much Not at all   Feeling tired or having little energy Several days   Poor appetite, weight loss, or overeating Not at all   Feeling bad about yourself - or that you are a failure or have let yourself or your family down Not at all   Trouble concentrating on things such as school, work, reading, or watching TV Not at all   Moving or speaking so slowly that other people could have noticed; or the opposite being so fidgety that others notice Not at all   How difficult have these problems made it for you to do your work, take care of your home and get along with others Not difficult at all       Alcohol & Drug Abuse Risk Screen    Do you average more than 1 drink per night or more than 7 drinks a week: In the past three months have you have had more than 4 drinks containing alcohol on one occasion:           Functional Ability and Level of Safety    Hearing:  The patient wears hearing aids.      Activities of Daily Living: The home contains: handrails and grab bars  Patient does total self care      Ambulation: with mild difficulty     Fall Risk:  Fall Risk Assessment, last 12 mths 4/21/2022   Able to walk? Yes   Fall in past 12 months? 0   Do you feel unsteady? 0   Are you worried about falling 0   Number of falls in past 12 months -   Fall with injury?  -      Abuse Screen:  Patient is not abused       Cognitive Screening    Has your family/caregiver stated any concerns about your memory: no     Cognitive Screening: Normal - Verbal Fluency Test    Health Maintenance Due     Health Maintenance Due   Topic Date Due    Shingrix Vaccine Age 49> (1 of 2) Never done    DTaP/Tdap/Td series (1 - Tdap) 01/02/2011    Pneumococcal 65+ years (2 - PCV) 05/22/2020    COVID-19 Vaccine (3 - Booster for Pfizer series) 08/20/2021       Patient Care Team   Patient Care Team:  Rosanna Mixon MD as PCP - General (Internal Medicine)  Rosanna Mixon MD as PCP - [facility] Empaneled Provider  Gumaro Reynaga MD (Urology)  Tao Salgado MD (Cardiology)  Kaelyn Brock MD (Otolaryngology)  Sandro Browne MD (Ophthalmology)    History     Patient Active Problem List   Diagnosis Code    CAD (coronary artery disease) I25.10    Tinnitus H93.19    Statin intolerance Z78.9    Urethral stricture N35.919    Essential hypertension I10    Insomnia G47.00    Cognitive decline R41.89    CVA, old, 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 Rosario Hwang  2010    prostatectomy     Current Outpatient Medications   Medication Sig Dispense Refill    traZODone (DESYREL) 50 mg tablet Take 1 Tablet by mouth nightly. 90 Tablet 1    DULoxetine (CYMBALTA) 20 mg capsule       leflunomide (ARAVA) 20 mg tablet 20 mg = 1 tab each dose, PO, daily, # 30 tab, 3 Refills, Pharmacy: [facility]/pharmacy #[id number]     tamsulosin (FLOMAX) 0.4 mg capsule Take 0.4 mg by mouth two (2) times a day.  sulfaSALAzine (AZULFIDINE) 500 mg tablet Take 500 mg by mouth two (2) times a day.  diclofenac (VOLTAREN) 1 % gel Apply  to affected area four (4) times daily.  METHOTREXATE PO Take  by mouth.  famotidine (PEPCID) 20 mg tablet Take 1 Tab by mouth two (2) times daily as needed for Other (reflux). 60 Tab 5    FINASTERIDE PO Take  by mouth.  B.infantis-B.ani-B.long-B.bifi (PROBIOTIC 4X) 10-15 mg TbEC Take  by mouth.  cyanocobalamin, vitamin B-12, (B-12 DOTS PO) Take  by mouth.  BIOTIN PO Take  by mouth.  cholecalciferol, vitamin D3, (VITAMIN D3 PO) Take  by mouth.  ketoconazole (NIZORAL) 2 % shampoo Apply  to affected area daily as needed for Itching.  fluocinoNIDE (LIDEX) 0.05 % external solution Apply  to affected area two (2) times a day.  tamsulosin (FLOMAX) 0.4 mg capsule Take 1 Cap by mouth daily. 90 Cap 1    apixaban (ELIQUIS) 5 mg tablet Take 5 mg by mouth two (2) times a day.  sotalol (BETAPACE) 80 mg tablet Take 40 mg by mouth two (2) times a day. Allergies   Allergen Reactions    Statins-Hmg-Coa Reductase Inhibitors Myalgia       Family History   Problem Relation Age of Onset    No Known Problems Mother     Heart Disease Father     Lung Disease Father      Social History     Tobacco Use    Smoking status: Former Smoker     Years: 5.00    Smokeless tobacco: Never Used   Substance Use Topics    Alcohol use:  Yes     Alcohol/week: 1.0 standard drink     Types: 1 Glasses of wine per week     Comment: nightly     He is followed for mild depression, mild anxiety, developed chronic back pain and had severe spinal stenosis and a year ago was mostly bound to a chair, could barely walk, was in a lot of pain. Dr. Deangelo Astorga at [facility]  did a minor procedure, but it was surgical and laminectomy and he has done quite a bit better. He recovered from that nicely and has improved. He is followed by Rheumatology pretty carefully because he is on high risk medications for psoriatic arthritis and he tells me has psoriatic arthritis particularly in his hands has been much better. He takes trazodone, which he uses for sleep and is requesting a refill. His other medications are all gotten from other physicians. He notes that he has been generally doing reasonably well. He was alert and he was oriented and his vitals were stable. He had a regular rhythm. Soft abdomen. Hands looked okay. There was no hot joints. No tenderness and hand  was reasonably good. I refilled his trazodone. Psoriatic arthritis seems to be stable. He has made a drastic improvement since he had back surgery a year ago and I talked to him about that in detail. His balance and gait is still poor and he will consider that. They looked the idea of people coming in home     Vitals:    04/21/22 1052 04/21/22 1112   BP: (!) 161/81 116/70   Pulse: 80    Resp: 16    Temp: 98.8 °F (37.1 °C)    TempSrc: Temporal    SpO2: 96%    Weight: 140 lb 13.8 oz (63.9 kg)    Height: 5' 10\" (1.778 m)      1. Insomnia, unspecified type  refills  - traZODone (DESYREL) 50 mg tablet; Take 1 Tablet by mouth nightly. Dispense: 90 Tablet; Refill: 1  - REFERRAL TO PHYSICAL THERAPY    2. Paroxysmal atrial fibrillation ([address]  reviewed    3. Chronic low back pain with sciatica, sciatica laterality unspecified, unspecified back pain laterality  Has improved a lot    4.  Balance disorder  Ongoing would benefit  - REFERRAL TO PHYSICAL THERAPY    5. Medicare annual wellness visit, subsequent  Will coordinate care with all the physicians and providers        Victorino Castillo MD

## 2022-05-27 LAB — CREATININE, EXTERNAL: 0.75

## 2022-10-09 DIAGNOSIS — G47.00 INSOMNIA, UNSPECIFIED TYPE: ICD-10-CM

## 2022-10-09 RX ORDER — TRAZODONE HYDROCHLORIDE 50 MG/1
50 TABLET ORAL
Qty: 90 TABLET | Refills: 1 | Status: SHIPPED | OUTPATIENT
Start: 2022-10-09

## 2022-10-31 ENCOUNTER — HOSPITAL ENCOUNTER (OUTPATIENT)
Dept: GENERAL RADIOLOGY | Age: 87
Discharge: HOME | End: 2022-10-31
Payer: MEDICARE

## 2022-10-31 ENCOUNTER — OFFICE VISIT (OUTPATIENT)
Dept: INTERNAL MEDICINE CLINIC | Age: 87
End: 2022-10-31
Payer: MEDICARE

## 2022-10-31 VITALS
DIASTOLIC BLOOD PRESSURE: 60 MMHG | HEART RATE: 66 BPM | SYSTOLIC BLOOD PRESSURE: 130 MMHG | RESPIRATION RATE: 16 BRPM | BODY MASS INDEX: 20.04 KG/M2 | WEIGHT: 140 LBS | TEMPERATURE: 98.2 F | HEIGHT: 70 IN | OXYGEN SATURATION: 96 %

## 2022-10-31 DIAGNOSIS — M54.6 ACUTE LEFT-SIDED THORACIC BACK PAIN: Primary | ICD-10-CM

## 2022-10-31 DIAGNOSIS — M54.6 ACUTE LEFT-SIDED THORACIC BACK PAIN: ICD-10-CM

## 2022-10-31 PROCEDURE — G8536 NO DOC ELDER MAL SCRN: HCPCS | Performed by: INTERNAL MEDICINE

## 2022-10-31 PROCEDURE — G0463 HOSPITAL OUTPT CLINIC VISIT: HCPCS | Performed by: INTERNAL MEDICINE

## 2022-10-31 PROCEDURE — 99213 OFFICE O/P EST LOW 20 MIN: CPT | Performed by: INTERNAL MEDICINE

## 2022-10-31 PROCEDURE — G8427 DOCREV CUR MEDS BY ELIG CLIN: HCPCS | Performed by: INTERNAL MEDICINE

## 2022-10-31 PROCEDURE — G8510 SCR DEP NEG, NO PLAN REQD: HCPCS | Performed by: INTERNAL MEDICINE

## 2022-10-31 PROCEDURE — G8420 CALC BMI NORM PARAMETERS: HCPCS | Performed by: INTERNAL MEDICINE

## 2022-10-31 PROCEDURE — 1101F PT FALLS ASSESS-DOCD LE1/YR: CPT | Performed by: INTERNAL MEDICINE

## 2022-10-31 PROCEDURE — 72072 X-RAY EXAM THORAC SPINE 3VWS: CPT

## 2022-10-31 RX ORDER — HYDROCODONE BITARTRATE AND ACETAMINOPHEN 5; 325 MG/1; MG/1
1 TABLET ORAL AS NEEDED
COMMUNITY
End: 2022-11-22

## 2022-10-31 NOTE — PROGRESS NOTES
Chief Complaint   Patient presents with    LOW BACK PAIN    Leg Pain     Patient is here today for left side lower back pain that at times radiates down into the upper thigh. The pain started on Saturday morning and his pain level at times is as high as a 7/10 on the pain scale. Vitals:    10/31/22 1037   BP: (!) 147/64   Pulse: 66   Resp: 16   Temp: 98.2 °F (36.8 °C)   TempSrc: Temporal   SpO2: 96%   Weight: 140 lb (63.5 kg)   Height: 5' 9.6\" (1.768 m)   PainSc:   3   PainLoc: Back       Current Outpatient Medications on File Prior to Visit   Medication Sig Dispense Refill    HYDROcodone-acetaminophen (NORCO) 5-325 mg per tablet Take 1 Tablet by mouth as needed for Pain. Patient rx is from a prior back surgery and has taken 1 dose only. Indications: pain      traZODone (DESYREL) 50 mg tablet TAKE 1 TABLET BY MOUTH NIGHTLY 90 Tablet 1    DULoxetine (CYMBALTA) 20 mg capsule       leflunomide (ARAVA) 20 mg tablet 20 mg = 1 tab each dose, PO, daily, # 30 tab, 3 Refills, Pharmacy: [facility]/pharmacy #[id number]     tamsulosin (FLOMAX) 0.4 mg capsule Take 0.4 mg by mouth two (2) times a day. sulfaSALAzine (AZULFIDINE) 500 mg tablet Take 500 mg by mouth two (2) times a day. diclofenac (VOLTAREN) 1 % gel Apply  to affected area four (4) times daily. famotidine (PEPCID) 20 mg tablet Take 1 Tab by mouth two (2) times daily as needed for Other (reflux). 60 Tab 5    FINASTERIDE PO Take  by mouth. B.animalis,bifid,infantis,long 10-15 mg TbEC Take  by mouth.      cyanocobalamin, vitamin B-12, (B-12 DOTS PO) Take  by mouth. cholecalciferol, vitamin D3, (VITAMIN D3 PO) Take  by mouth. apixaban (ELIQUIS) 5 mg tablet Take 5 mg by mouth two (2) times a day. sotalol (BETAPACE) 80 mg tablet Take 40 mg by mouth two (2) times a day. METHOTREXATE PO Take  by mouth. (Patient not taking: Reported on 10/31/2022)      BIOTIN PO Take  by mouth.  (Patient not taking: Reported on 10/31/2022) ketoconazole (NIZORAL) 2 % shampoo Apply  to affected area daily as needed for Itching. (Patient not taking: Reported on 10/31/2022)      fluocinoNIDE (LIDEX) 0.05 % external solution Apply  to affected area two (2) times a day. tamsulosin (FLOMAX) 0.4 mg capsule Take 1 Cap by mouth daily. (Patient not taking: Reported on 10/31/2022) 90 Cap 1     No current facility-administered medications on file prior to visit. Health Maintenance Due   Topic    Shingrix Vaccine Age 50> (1 of 2)    DTaP/Tdap/Td series (1 - Tdap)    Pneumococcal 65+ years (2 - PCV)    COVID-19 Vaccine (3 - Booster for [employer] series)    Flu Vaccine (1)       1. \"Have you been to the ER, urgent care clinic since your last visit? Hospitalized since your last visit? \" No    2. \"Have you seen or consulted any other health care providers outside of the [address] [place] [address] since your last visit? \" No     3. For patients aged 39-70: Has the patient had a colonoscopy / FIT/ Cologuard? NA - based on age      If the patient is female:    4. For patients aged 41-77: Has the patient had a mammogram within the past 2 years? NA - based on age or sex      11. For patients aged 21-65: Has the patient had a pap smear?  NA - based on age or sex

## 2022-10-31 NOTE — PROGRESS NOTES
Tiarra Park is a 80 y.o. male  Chief Complaint   Patient presents with    LOW BACK PAIN    Leg Pain     Patient is here today for left side lower back pain that at times radiates down into the upper thigh. The pain started on Saturday morning and his pain level at times is as high as a 7/10 on the pain scale. Visit Vitals  /60   Pulse 66   Temp 98.2 °F (36.8 °C) (Temporal)   Resp 16   Ht 5' 9.6\" (1.768 m)   Wt 140 lb (63.5 kg)   SpO2 96%   BMI 20.32 kg/m²          HPI  Mr.Roger Cathyann Snellen is an 80 y.o. male with history of CAD, HTN, prostate cancer, psoriatic arthritis and spinal stenosis s/p L3 laminectomy and discetomy in 2021 presents with 2 days of mild back pain, more thoarac or flank pain, the pain stays in the same place, doesn't radiate to his legs. He presents with his wife, they don't recall any trauma, but he had lifted a heavy object about 2 weeks ago. Otherwise, no focal neuro deficit noted. He reports transient change in vision on one early morning that lasted few seconds. The vision change is described as words not appearing right. Social History     Socioeconomic History    Marital status:    Tobacco Use    Smoking status: Former     Years: 5.00     Types: Cigarettes    Smokeless tobacco: Never   Vaping Use    Vaping Use: Never used   Substance and Sexual Activity    Alcohol use: Yes     Alcohol/week: 1.0 standard drink     Types: 1 Glasses of wine per week     Comment: nightly    Drug use: No    Sexual activity: Never     Social Determinants of Health     Financial Resource Strain: Low Risk     Difficulty of Paying Living Expenses: Not hard at all   Food Insecurity: No Food Insecurity    Worried About [employer] in the Last Year: Never true    Ran Out of Food in the Last Year: Never true      .   Past Medical History:   Diagnosis Date    Hypertension     Prostate CA ([facility] 8/31/2015    Tinnitus         Allergies   Allergen Reactions    Statins-Hmg-Coa Reductase Inhibitors Myalgia          ROS  Review of Systems   All other systems reviewed and are negative. EXAM  Physical Exam  Vitals reviewed. Constitutional:       Appearance: Normal appearance. HENT:      Head: Normocephalic and atraumatic. Cardiovascular:      Rate and Rhythm: Normal rate and regular rhythm. Pulses: Normal pulses. Heart sounds: Normal heart sounds. No murmur heard. Pulmonary:      Effort: Pulmonary effort is normal. No respiratory distress. Breath sounds: Normal breath sounds. Abdominal:      Tenderness: There is left CVA tenderness. Neurological:      Mental Status: He is alert. Health Maintenance Due   Topic Date Due    Shingrix Vaccine Age 49> (1 of 2) Never done    DTaP/Tdap/Td series (1 - Tdap) 01/02/2011    Pneumococcal 65+ years (2 - PCV) 05/22/2020    COVID-19 Vaccine (3 - Booster for Pfizer series) 05/15/2021    Flu Vaccine (1) 08/01/2022       ASSESSMENT/PLAN    Diagnoses and all orders for this visit:    1. Acute left-sided thoracic back pain  -     XR SPINE THORAC 3 V; Future  -     URINALYSIS W/ REFLEX CULTURE; Future  -     METABOLIC PANEL, COMPREHENSIVE;  Future    -heat/cold compression, tylenol prn for pain     Eliel Mcdonald MD

## 2022-11-01 ENCOUNTER — TELEPHONE (OUTPATIENT)
Dept: INTERNAL MEDICINE CLINIC | Age: 87
End: 2022-11-01

## 2022-11-01 LAB
ALBUMIN SERPL-MCNC: 3.8 G/DL (ref 3.5–5)
ALBUMIN/GLOB SERPL: 1.3 {RATIO} (ref 1.1–2.2)
ALP SERPL-CCNC: 69 U/L (ref 45–117)
ALT SERPL-CCNC: 21 U/L (ref 12–78)
ANION GAP SERPL CALC-SCNC: 2 MMOL/L (ref 5–15)
APPEARANCE UR: CLEAR
AST SERPL-CCNC: 24 U/L (ref 15–37)
BACTERIA URNS QL MICRO: NEGATIVE /HPF
BILIRUB SERPL-MCNC: 1 MG/DL (ref 0.2–1)
BILIRUB UR QL CFM: NEGATIVE
BUN SERPL-MCNC: 20 MG/DL (ref 6–20)
BUN/CREAT SERPL: 15 (ref 12–20)
CALCIUM SERPL-MCNC: 8.7 MG/DL (ref 8.5–10.1)
CHLORIDE SERPL-SCNC: 107 MMOL/L (ref 97–108)
CO2 SERPL-SCNC: 32 MMOL/L (ref 21–32)
COLOR UR: ABNORMAL
CREAT SERPL-MCNC: 1.32 MG/DL (ref 0.7–1.3)
EPITH CASTS URNS QL MICRO: ABNORMAL /LPF
GLOBULIN SER CALC-MCNC: 3 G/DL (ref 2–4)
GLUCOSE SERPL-MCNC: 90 MG/DL (ref 65–100)
GLUCOSE UR STRIP.AUTO-MCNC: NEGATIVE MG/DL
HGB UR QL STRIP: NEGATIVE
KETONES UR QL STRIP.AUTO: ABNORMAL MG/DL
LEUKOCYTE ESTERASE UR QL STRIP.AUTO: NEGATIVE
NITRITE UR QL STRIP.AUTO: NEGATIVE
PH UR STRIP: 5.5 [PH] (ref 5–8)
POTASSIUM SERPL-SCNC: 4.3 MMOL/L (ref 3.5–5.1)
PROT SERPL-MCNC: 6.8 G/DL (ref 6.4–8.2)
PROT UR STRIP-MCNC: ABNORMAL MG/DL
RBC #/AREA URNS HPF: ABNORMAL /HPF (ref 0–5)
SODIUM SERPL-SCNC: 141 MMOL/L (ref 136–145)
SP GR UR REFRACTOMETRY: 1.03 (ref 1–1.03)
UA: UC IF INDICATED,UAUC: ABNORMAL
UROBILINOGEN UR QL STRIP.AUTO: 0.2 EU/DL (ref 0.2–1)
WBC URNS QL MICRO: ABNORMAL /HPF (ref 0–4)

## 2022-11-01 NOTE — PROGRESS NOTES
Please call patient, results show unchanged xray, degenerative changes which indicates osteo arthritis. Your kidney function is slightly off due to dehydration, your urine didn't show infection, but showed some signs of infection. Please hydrate with enough water, and eat when you can( example multiple small meals). Tylenol is good for pain management.

## 2022-11-02 ENCOUNTER — TELEPHONE (OUTPATIENT)
Dept: INTERNAL MEDICINE CLINIC | Age: 87
End: 2022-11-02

## 2022-11-02 NOTE — TELEPHONE ENCOUNTER
Spouse returning phone call from earlier to discuss pt's results. No nurse available to transfer pt to at this time. Please call pt back to discuss.

## 2022-11-22 ENCOUNTER — OFFICE VISIT (OUTPATIENT)
Dept: INTERNAL MEDICINE CLINIC | Age: 87
End: 2022-11-22
Payer: MEDICARE

## 2022-11-22 VITALS
DIASTOLIC BLOOD PRESSURE: 69 MMHG | TEMPERATURE: 98.2 F | WEIGHT: 139.4 LBS | BODY MASS INDEX: 19.96 KG/M2 | HEIGHT: 70 IN | HEART RATE: 62 BPM | RESPIRATION RATE: 20 BRPM | OXYGEN SATURATION: 97 % | SYSTOLIC BLOOD PRESSURE: 136 MMHG

## 2022-11-22 DIAGNOSIS — M51.16 LUMBAR DISC DISEASE WITH RADICULOPATHY: ICD-10-CM

## 2022-11-22 DIAGNOSIS — N18.31 STAGE 3A CHRONIC KIDNEY DISEASE (HCC): ICD-10-CM

## 2022-11-22 DIAGNOSIS — I10 ESSENTIAL HYPERTENSION: ICD-10-CM

## 2022-11-22 DIAGNOSIS — R26.9 ABNORMALITY OF GAIT AND MOBILITY: ICD-10-CM

## 2022-11-22 DIAGNOSIS — Z79.899 POLYPHARMACY: ICD-10-CM

## 2022-11-22 DIAGNOSIS — I95.1 ORTHOSTATIC HYPOTENSION: Primary | ICD-10-CM

## 2022-11-22 DIAGNOSIS — R41.89 COGNITIVE DECLINE: ICD-10-CM

## 2022-11-22 PROCEDURE — G8427 DOCREV CUR MEDS BY ELIG CLIN: HCPCS | Performed by: INTERNAL MEDICINE

## 2022-11-22 PROCEDURE — 1101F PT FALLS ASSESS-DOCD LE1/YR: CPT | Performed by: INTERNAL MEDICINE

## 2022-11-22 PROCEDURE — 99214 OFFICE O/P EST MOD 30 MIN: CPT | Performed by: INTERNAL MEDICINE

## 2022-11-22 PROCEDURE — G8420 CALC BMI NORM PARAMETERS: HCPCS | Performed by: INTERNAL MEDICINE

## 2022-11-22 PROCEDURE — G0463 HOSPITAL OUTPT CLINIC VISIT: HCPCS | Performed by: INTERNAL MEDICINE

## 2022-11-22 PROCEDURE — G8536 NO DOC ELDER MAL SCRN: HCPCS | Performed by: INTERNAL MEDICINE

## 2022-11-22 PROCEDURE — G8432 DEP SCR NOT DOC, RNG: HCPCS | Performed by: INTERNAL MEDICINE

## 2022-11-22 RX ORDER — FINASTERIDE 1 MG/1
1 TABLET, FILM COATED ORAL DAILY
COMMUNITY
Start: 2022-08-30 | End: 2022-11-22 | Stop reason: ALTCHOICE

## 2022-11-22 RX ORDER — TAMSULOSIN HYDROCHLORIDE 0.4 MG/1
0.4 CAPSULE ORAL DAILY
Qty: 90 CAPSULE | Refills: 1
Start: 2022-11-22

## 2022-11-22 NOTE — PROGRESS NOTES
Patient here today for medication review. Chief Complaint   Patient presents with    Medication Evaluation          Vitals:    11/22/22 0949   Temp: 98.2 °F (36.8 °C)   TempSrc: Oral   Weight: 139 lb 6.4 oz (63.2 kg)   Height: 5' 9.6\" (1.768 m)   PainSc:   0 - No pain       Current Outpatient Medications on File Prior to Visit   Medication Sig Dispense Refill    finasteride (PROPECIA) 1 mg tablet Take 1 mg by mouth daily. traZODone (DESYREL) 50 mg tablet TAKE 1 TABLET BY MOUTH NIGHTLY 90 Tablet 1    DULoxetine (CYMBALTA) 20 mg capsule       leflunomide (ARAVA) 20 mg tablet 20 mg = 1 tab each dose, PO, daily, # 30 tab, 3 Refills, Pharmacy: [facility]/pharmacy #[id number]     tamsulosin (FLOMAX) 0.4 mg capsule Take 0.4 mg by mouth two (2) times a day. sulfaSALAzine (AZULFIDINE) 500 mg tablet Take 500 mg by mouth two (2) times a day. diclofenac (VOLTAREN) 1 % gel Apply  to affected area four (4) times daily. B.animalis,bifid,infantis,long 10-15 mg TbEC Take  by mouth.      cyanocobalamin, vitamin B-12, (B-12 DOTS PO) Take  by mouth. cholecalciferol, vitamin D3, (VITAMIN D3 PO) Take  by mouth.      ketoconazole (NIZORAL) 2 % shampoo Apply  to affected area daily as needed for Itching. apixaban (ELIQUIS) 5 mg tablet Take 5 mg by mouth two (2) times a day. sotalol (BETAPACE) 80 mg tablet Take 40 mg by mouth two (2) times a day. [DISCONTINUED] HYDROcodone-acetaminophen (NORCO) 5-325 mg per tablet Take 1 Tablet by mouth as needed for Pain. Patient rx is from a prior back surgery and has taken 1 dose only. Indications: pain      [DISCONTINUED] METHOTREXATE PO Take  by mouth. (Patient not taking: Reported on 10/31/2022)      [DISCONTINUED] famotidine (PEPCID) 20 mg tablet Take 1 Tab by mouth two (2) times daily as needed for Other (reflux). 60 Tab 5    [DISCONTINUED] FINASTERIDE PO Take  by mouth. [DISCONTINUED] BIOTIN PO Take  by mouth.  (Patient not taking: Reported on 10/31/2022) [DISCONTINUED] fluocinoNIDE (LIDEX) 0.05 % external solution Apply  to affected area two (2) times a day. [DISCONTINUED] tamsulosin (FLOMAX) 0.4 mg capsule Take 1 Cap by mouth daily. 90 Cap 1     No current facility-administered medications on file prior to visit. Health Maintenance Due   Topic    DTaP/Tdap/Td series (1 - Tdap)    Shingrix Vaccine Age 50> (2 of 2)    Pneumococcal 65+ years (2 - PCV)    COVID-19 Vaccine (3 - Booster for [employer] series)    Flu Vaccine (1)       1. \"Have you been to the ER, urgent care clinic since your last visit? Hospitalized since your last visit? \" No    2. \"Have you seen or consulted any other health care providers outside of the [address] [place] [address] since your last visit? \" Yes Where: Dr. Eli Vizcarra, Cardiologist      3. For patients aged 39-70: Has the patient had a colonoscopy / FIT/ Cologuard? No      If the patient is female:    4. For patients aged 41-77: Has the patient had a mammogram within the past 2 years? NA - based on age or sex      11. For patients aged 21-65: Has the patient had a pap smear?  NA - based on age or sex

## 2022-11-22 NOTE — PROGRESS NOTES
Subjective:   Uri Baker is a 80 y.o. 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    ACP (advance care planning) Z71.89    Paroxysmal atrial fibrillation (HCC) I48.0    Chronic back pain M54.9, G89.29    Current use of long term anticoagulation Z79.01    History of prostate cancer Z85.46     Patient Active Problem List    Diagnosis Date Noted    History of prostate cancer 03/30/2020    Current use of long term anticoagulation 09/11/2019    Chronic back pain 02/19/2019    Paroxysmal atrial fibrillation ([facility] [address] 08/20/2018    ACP (advance care planning) 07/21/2017    CVA, old, cognitive deficits 02/13/2017    Cognitive decline 09/02/2016    Urethral stricture 03/02/2016    Essential hypertension 03/02/2016    Insomnia 03/02/2016    Statin intolerance 02/11/2016    CAD (coronary artery disease) 08/31/2015    Tinnitus 08/31/2015     Past Medical History:   Diagnosis Date    Hypertension     Prostate CA ([facility] [address] 8/31/2015    Tinnitus      Past Surgical History:   Procedure Laterality Date    HX LITHOTRIPSY      HX PROSTATECTOMY  ~2012    prostatectomy    HX UROLOGICAL  2016    patient not sure of name of procedure, completed by Dr. Ara Barrientos  2010    prostatectomy    NEUROLOGICAL PROCEDURE UNLISTED  2021    spine     Family History   Problem Relation Age of Onset    No Known Problems Mother     Heart Disease Father     Lung Disease Father      Social History     Tobacco Use    Smoking status: Former     Years: 5.00     Types: Cigarettes    Smokeless tobacco: Never   Substance Use Topics    Alcohol use: Yes     Alcohol/week: 1.0 standard drink     Types: 1 Glasses of wine per week     Comment: nightly   .  He  is accompanied by patient and spouse. He lives as follows: a spouse and does have Advanced Directives. New Complaints today include: Medication Evaluation       Recent History includes:  back pain from lifting resolved    Review of Systems  Pertinent items are noted in HPI. Geriatric Issues: Activities of Daily Living (ADLs):    He is independent in the following: ambulation, bathing and hygiene, feeding, continence, grooming, toileting, and dressing  Requires assistance with the following: food mprep walking  Home safety, Cognative status:  mild improvement, and orthostatic changes    Outside reports reviewed: office notes, cardiology.   Lab Results   Component Value Date/Time    WBC 8.8 02/19/2019 11:51 AM    HGB 15.3 02/19/2019 11:51 AM    HCT 46.5 02/19/2019 11:51 AM    PLATELET 503 72/95/4887 11:51 AM     (H) 02/19/2019 11:51 AM     Lab Results   Component Value Date/Time    Glucose 90 10/31/2022 11:38 AM    LDL, calculated 154 (H) 02/19/2019 11:51 AM    Creatinine (POC) 0.9 04/08/2013 03:59 PM    Creatinine 1.32 (H) 10/31/2022 11:38 AM      Lab Results   Component Value Date/Time    GFR est non-AA 84 02/19/2019 11:51 AM    GFRNA, POC >60 04/08/2013 03:59 PM    GFR est AA 97 02/19/2019 11:51 AM    GFRAA, POC >60 04/08/2013 03:59 PM    Creatinine 1.32 (H) 10/31/2022 11:38 AM    Creatinine (POC) 0.9 04/08/2013 03:59 PM    BUN 20 10/31/2022 11:38 AM    Sodium 141 10/31/2022 11:38 AM    Potassium 4.3 10/31/2022 11:38 AM    Chloride 107 10/31/2022 11:38 AM    CO2 32 10/31/2022 11:38 AM    Magnesium 2.3 02/11/2016 09:14 AM              Objective:   Visit Vitals  /69 (BP 1 Location: Right upper arm, BP Patient Position: Sitting, BP Cuff Size: Adult)   Pulse 62   Temp 98.2 °F (36.8 °C) (Oral)   Resp 20   Ht 5' 9.6\" (1.768 m)   Wt 139 lb 6.4 oz (63.2 kg)   SpO2 97%   BMI 20.23 kg/m²     Wt Readings from Last 3 Encounters:   11/22/22 139 lb 6.4 oz (63.2 kg)   10/31/22 140 lb (63.5 kg)   04/21/22 140 lb 13.8 oz (63.9 kg)       Visit Vitals  /69 (BP 1 Location: Right upper arm, BP Patient Position: Sitting, BP Cuff Size: Adult)   Pulse 62   Temp 98.2 °F (36.8 °C) (Oral)   Resp 20   Ht 5' 9.6\" (1.768 m)   Wt 139 lb 6.4 oz (63.2 kg)   SpO2 97%   BMI 20.23 kg/m²     General appearance: alert, cooperative, no distress, appears older than stated age  Throat: Lips, mucosa, and tongue normal. Teeth and gums normal  Neck: supple, symmetrical, trachea midline, no adenopathy, thyroid: not enlarged, symmetric, no tenderness/mass/nodules, no carotid bruit, and no JVD  Lungs: clear to auscultation bilaterally  Heart: irregularly irregular rhythm  Abdomen: soft, non-tender. Bowel sounds normal. No masses,  no organomegaly  Extremities: extremities normal, atraumatic, no cyanosis or edema  Thin   Gait slow  Imaging      Lab Review  Lab Results   Component Value Date/Time    GFR est non-AA 84 02/19/2019 11:51 AM    GFRNA, POC >60 04/08/2013 03:59 PM    GFR est AA 97 02/19/2019 11:51 AM    GFRAA, POC >60 04/08/2013 03:59 PM    Creatinine 1.32 (H) 10/31/2022 11:38 AM    Creatinine (POC) 0.9 04/08/2013 03:59 PM    BUN 20 10/31/2022 11:38 AM    Sodium 141 10/31/2022 11:38 AM    Potassium 4.3 10/31/2022 11:38 AM    Chloride 107 10/31/2022 11:38 AM    CO2 32 10/31/2022 11:38 AM    Magnesium 2.3 02/11/2016 09:14 AM        Assessment/Plan:       ICD-10-CM ICD-9-CM    1. Orthostatic hypotension  I95.1 458.0       2. Essential hypertension  I10 401.9       3. Lumbar disc disease with radiculopathy  M51.16 722.10      724.4       4. Cognitive decline  R41.89 294.9       5. Stage 3a chronic kidney disease (HCC)  N18.31 585.3       6. Polypharmacy  Z79.899 V58.69       7.  Abnormality of gait and mobility  R26.9 781.2 REFERRAL TO PHYSICAL THERAPY        the following changes in treatment are made: reduce eliquis to 2.5 bid reduce flomax to one a day  Stop propecia  lab results and schedule of future lab studies reviewed with patient      High risk medications reviewed  Add home PT  Labs 6 month

## 2023-03-29 DIAGNOSIS — G47.00 INSOMNIA, UNSPECIFIED TYPE: ICD-10-CM

## 2023-03-29 RX ORDER — TRAZODONE HYDROCHLORIDE 50 MG/1
50 TABLET ORAL
Qty: 90 TABLET | Refills: 1 | Status: SHIPPED | OUTPATIENT
Start: 2023-03-29

## 2023-05-22 ENCOUNTER — TELEPHONE (OUTPATIENT)
Age: 88
End: 2023-05-22

## 2023-05-25 ENCOUNTER — OFFICE VISIT (OUTPATIENT)
Age: 88
End: 2023-05-25
Payer: MEDICARE

## 2023-05-25 VITALS
DIASTOLIC BLOOD PRESSURE: 66 MMHG | WEIGHT: 136 LBS | HEIGHT: 70 IN | SYSTOLIC BLOOD PRESSURE: 135 MMHG | RESPIRATION RATE: 16 BRPM | OXYGEN SATURATION: 97 % | BODY MASS INDEX: 19.47 KG/M2 | HEART RATE: 66 BPM | TEMPERATURE: 98.2 F

## 2023-05-25 DIAGNOSIS — M79.604 PAIN IN BOTH LOWER EXTREMITIES: Primary | ICD-10-CM

## 2023-05-25 DIAGNOSIS — I10 ESSENTIAL (PRIMARY) HYPERTENSION: ICD-10-CM

## 2023-05-25 DIAGNOSIS — M79.604 PAIN IN BOTH LOWER EXTREMITIES: ICD-10-CM

## 2023-05-25 DIAGNOSIS — M54.16 LUMBAR RADICULOPATHY: ICD-10-CM

## 2023-05-25 DIAGNOSIS — M79.605 PAIN IN BOTH LOWER EXTREMITIES: ICD-10-CM

## 2023-05-25 DIAGNOSIS — M79.605 PAIN IN BOTH LOWER EXTREMITIES: Primary | ICD-10-CM

## 2023-05-25 DIAGNOSIS — Z91.81 AT HIGH RISK FOR FALLS: ICD-10-CM

## 2023-05-25 LAB
ANION GAP SERPL CALC-SCNC: 3 MMOL/L (ref 5–15)
BUN SERPL-MCNC: 14 MG/DL (ref 6–20)
BUN/CREAT SERPL: 19 (ref 12–20)
CALCIUM SERPL-MCNC: 8.6 MG/DL (ref 8.5–10.1)
CHLORIDE SERPL-SCNC: 110 MMOL/L (ref 97–108)
CO2 SERPL-SCNC: 29 MMOL/L (ref 21–32)
CREAT SERPL-MCNC: 0.74 MG/DL (ref 0.7–1.3)
D DIMER PPP FEU-MCNC: 0.63 MG/L FEU (ref 0–0.65)
GLUCOSE SERPL-MCNC: 99 MG/DL (ref 65–100)
POTASSIUM SERPL-SCNC: 4.2 MMOL/L (ref 3.5–5.1)
SODIUM SERPL-SCNC: 142 MMOL/L (ref 136–145)

## 2023-05-25 PROCEDURE — 1123F ACP DISCUSS/DSCN MKR DOCD: CPT | Performed by: INTERNAL MEDICINE

## 2023-05-25 PROCEDURE — 99213 OFFICE O/P EST LOW 20 MIN: CPT | Performed by: INTERNAL MEDICINE

## 2023-05-25 PROCEDURE — 1036F TOBACCO NON-USER: CPT | Performed by: INTERNAL MEDICINE

## 2023-05-25 PROCEDURE — G8420 CALC BMI NORM PARAMETERS: HCPCS | Performed by: INTERNAL MEDICINE

## 2023-05-25 PROCEDURE — G8428 CUR MEDS NOT DOCUMENT: HCPCS | Performed by: INTERNAL MEDICINE

## 2023-05-25 SDOH — ECONOMIC STABILITY: HOUSING INSECURITY
IN THE LAST 12 MONTHS, WAS THERE A TIME WHEN YOU DID NOT HAVE A STEADY PLACE TO SLEEP OR SLEPT IN A SHELTER (INCLUDING NOW)?: NO

## 2023-05-25 SDOH — ECONOMIC STABILITY: FOOD INSECURITY: WITHIN THE PAST 12 MONTHS, YOU WORRIED THAT YOUR FOOD WOULD RUN OUT BEFORE YOU GOT MONEY TO BUY MORE.: NEVER TRUE

## 2023-05-25 SDOH — ECONOMIC STABILITY: INCOME INSECURITY: HOW HARD IS IT FOR YOU TO PAY FOR THE VERY BASICS LIKE FOOD, HOUSING, MEDICAL CARE, AND HEATING?: NOT HARD AT ALL

## 2023-05-25 SDOH — ECONOMIC STABILITY: FOOD INSECURITY: WITHIN THE PAST 12 MONTHS, THE FOOD YOU BOUGHT JUST DIDN'T LAST AND YOU DIDN'T HAVE MONEY TO GET MORE.: NEVER TRUE

## 2023-05-25 ASSESSMENT — PATIENT HEALTH QUESTIONNAIRE - PHQ9
SUM OF ALL RESPONSES TO PHQ QUESTIONS 1-9: 0
1. LITTLE INTEREST OR PLEASURE IN DOING THINGS: 0
2. FEELING DOWN, DEPRESSED OR HOPELESS: 0
SUM OF ALL RESPONSES TO PHQ QUESTIONS 1-9: 0
SUM OF ALL RESPONSES TO PHQ9 QUESTIONS 1 & 2: 0

## 2023-05-25 ASSESSMENT — ENCOUNTER SYMPTOMS
SHORTNESS OF BREATH: 0
COUGH: 0

## 2023-05-25 NOTE — PROGRESS NOTES
Nolan Evans is a 80 y.o. male  Chief Complaint   Patient presents with    Blood Pressure Check     Vitals:    05/25/23 0855   BP: 135/66   Pulse: 66   Resp: 16   Temp: 98.2 °F (36.8 °C)   SpO2: 97%          HPI  Mr.Roger Alex Valdovinos is an 80 y.o. male with history of HTN and lumbar laminectomy presents with low back pain radiating to his thigh. Pain migrated from his left side to right side. He used tylenol with benefit. His wife also reports that his blood pressure has been high up to 190/100's. he Saw Cardiolgy and lisinopril was started and titrated up to 20mg. He has mild cough due to that. His blood pressure is improving. Patient can take a shower, feed himself, and doesn't need help with toilet, he lost a a few pounds, encouraged taking ensure and increasing diet and hydration. Recommend going to his orthopedics for this radicular pain, we will rule out clot as abixaban was reduced recently. .  Past Medical History:   Diagnosis Date    Hypertension     Prostate CA ([address] 8/31/2015    Tinnitus             ROS  Review of Systems   Constitutional:  Negative for fever. Respiratory:  Negative for cough and shortness of breath. Cardiovascular:  Negative for chest pain and palpitations. Neurological:  Negative for headaches. Psychiatric/Behavioral:  Negative for dysphoric mood. EXAM  Physical Exam  Vitals reviewed. HENT:      Head: Normocephalic and atraumatic. Cardiovascular:      Rate and Rhythm: Normal rate and regular rhythm. Pulses: Normal pulses. Heart sounds: Normal heart sounds. No murmur heard. Pulmonary:      Effort: Pulmonary effort is normal. No respiratory distress. Breath sounds: Normal breath sounds. Musculoskeletal:         General: Tenderness present. Arms:    Neurological:      General: No focal deficit present. Mental Status: He is alert.        Health Maintenance Due   Topic Date Due    Prostate Specific Antigen (PSA) Screening or

## 2023-05-30 ENCOUNTER — HOSPITAL ENCOUNTER (OUTPATIENT)
Facility: HOSPITAL | Age: 88
Discharge: HOME | End: 2023-06-01
Attending: INTERNAL MEDICINE
Payer: MEDICARE

## 2023-05-30 DIAGNOSIS — M79.605 PAIN IN BOTH LOWER EXTREMITIES: ICD-10-CM

## 2023-05-30 DIAGNOSIS — M79.604 PAIN IN BOTH LOWER EXTREMITIES: ICD-10-CM

## 2023-05-30 PROCEDURE — 93970 EXTREMITY STUDY: CPT

## 2023-05-31 ENCOUNTER — TELEPHONE (OUTPATIENT)
Age: 88
End: 2023-05-31

## 2023-05-31 NOTE — TELEPHONE ENCOUNTER
Called Patient's wife to in form them that leg ultrasound was normal and they must see ortho.  Patient understood atrial fibrillation/frequent PACs atrial fibrillation/frequency PVCs

## 2023-06-01 ENCOUNTER — TELEPHONE (OUTPATIENT)
Age: 88
End: 2023-06-01

## 2023-06-02 ENCOUNTER — TELEPHONE (OUTPATIENT)
Age: 88
End: 2023-06-02

## 2023-06-02 NOTE — TELEPHONE ENCOUNTER
----- Message from Marah Neely MD sent at 6/1/2023  7:31 AM EDT -----  Results are normal, no evidence of DVT or blood clot. Go to your orthopedic doctor for your back pain.

## 2023-06-02 NOTE — TELEPHONE ENCOUNTER
Spoke with wife PHI and gave results per Dr. Raquel Colin, new message sent for pain medication request.

## 2023-06-05 ENCOUNTER — TELEPHONE (OUTPATIENT)
Age: 88
End: 2023-06-05

## 2023-06-05 DIAGNOSIS — M54.16 LUMBAR RADICULOPATHY: Primary | ICD-10-CM

## 2023-06-05 RX ORDER — METHYLPREDNISOLONE 4 MG/1
TABLET ORAL
Qty: 1 KIT | Refills: 0 | Status: SHIPPED | OUTPATIENT
Start: 2023-06-05 | End: 2023-06-11

## 2023-06-05 NOTE — TELEPHONE ENCOUNTER
----- Message from Haylee Bhakta sent at 6/5/2023 11:59 AM EDT -----  Subject: Refill Request    QUESTIONS  Name of Medication? Other - HYDROCODONE ACTEMIN 5-325 MG  Patient-reported dosage and instructions? 1 TAB EVERY 6 HOURS AS NEEDED  How many days do you have left? 0  Preferred Pharmacy? [id number]  Pharmacy phone number (if available)? [phone]  Additional Information for Provider? PT IS HAVING SEVERE BACK PAIN AND   DOWN IN HIS RIGHT LEG IS OUT OF PAIN MANAGEMENT MEDICATIONS NEEDS A REFILL   ASAP , PT HAS AN APPT FOR NEUROSURGEON NEXT WEEK AND WAS TOLD TO CALL PCP   FOR PAIN MEDS,PT HAS NOTHING TIL NEXT WEEK  ---------------------------------------------------------------------------  --------------  CALL BACK INFO  What is the best way for the office to contact you? OK to leave message on   voicemail  Preferred Call Back Phone Number? [phone]  ---------------------------------------------------------------------------  --------------  SCRIPT ANSWERS  Relationship to Patient? Spouse/Partner  Representative Name? RUCHI  Is the representative on the Communication Release of Information (SULIAMAN)   form in Epic?  Yes

## 2023-06-06 RX ORDER — HYDROCODONE BITARTRATE AND ACETAMINOPHEN 5; 325 MG/1; MG/1
1 TABLET ORAL EVERY 6 HOURS PRN
Qty: 28 TABLET | Refills: 0 | Status: SHIPPED | OUTPATIENT
Start: 2023-06-06 | End: 2023-06-13

## 2023-06-09 ENCOUNTER — TELEPHONE (OUTPATIENT)
Age: 88
End: 2023-06-09

## 2023-06-09 NOTE — TELEPHONE ENCOUNTER
Called and left a message. Patient didn't answer x2   If muscle weakness, need to go to ER. If it has relationship with steroid, ok with stopping steroid.

## 2023-06-09 NOTE — TELEPHONE ENCOUNTER
Patient spouse call Onur Queen) said that she believe patient having extreme body weakness from medication methylPREDNISolone . She ask that  give her a call today.

## 2023-06-21 ENCOUNTER — TELEPHONE (OUTPATIENT)
Age: 88
End: 2023-06-21

## 2023-06-21 DIAGNOSIS — M54.16 LUMBAR RADICULOPATHY: Primary | ICD-10-CM

## 2023-06-21 DIAGNOSIS — M51.16 INTERVERTEBRAL DISC DISORDERS WITH RADICULOPATHY, LUMBAR REGION: ICD-10-CM

## 2023-06-21 RX ORDER — HYDROCODONE BITARTRATE AND ACETAMINOPHEN 5; 325 MG/1; MG/1
1 TABLET ORAL EVERY 4 HOURS PRN
Qty: 27 TABLET | Refills: 0 | Status: SHIPPED | OUTPATIENT
Start: 2023-06-21 | End: 2023-07-05

## 2023-06-22 NOTE — TELEPHONE ENCOUNTER
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 I saw him for similar pain a few days ago and Dr. Gisell De La O filled prescription on June 6  based  on PDMP website. Jairo Jean

## 2023-07-11 RX ORDER — APIXABAN 2.5 MG/1
TABLET, FILM COATED ORAL
Qty: 60 TABLET | Refills: 5 | Status: SHIPPED | OUTPATIENT
Start: 2023-07-11

## 2024-03-26 ENCOUNTER — TELEPHONE (OUTPATIENT)
Age: 89
End: 2024-03-26

## 2024-03-26 NOTE — TELEPHONE ENCOUNTER
Per Patient request in basket message. There was no answer and left voicemail to make appointment.

## 2024-04-02 RX ORDER — APIXABAN 2.5 MG/1
2.5 TABLET, FILM COATED ORAL 2 TIMES DAILY
Qty: 60 TABLET | Refills: 5 | Status: SHIPPED | OUTPATIENT
Start: 2024-04-02

## 2024-04-02 NOTE — TELEPHONE ENCOUNTER
Refill request received from [facility] for   Requested Prescriptions     Pending Prescriptions Disp Refills    ELIQUIS 2.5 MG TABS tablet [Pharmacy Med Name: ELIQUIS 2.5 MG TABLET] 60 tablet 5     Sig: TAKE 1 TABLET BY MOUTH TWICE A DAY     Last office visit: 5/25/2023   Next office visit: 4/23/2024     Routed to Dr Luís Dejesus for review.

## 2024-04-23 ENCOUNTER — OFFICE VISIT (OUTPATIENT)
Age: 89
End: 2024-04-23
Payer: MEDICARE

## 2024-04-23 VITALS
TEMPERATURE: 97.5 F | HEART RATE: 59 BPM | HEIGHT: 69 IN | WEIGHT: 139 LBS | RESPIRATION RATE: 16 BRPM | OXYGEN SATURATION: 95 % | SYSTOLIC BLOOD PRESSURE: 99 MMHG | DIASTOLIC BLOOD PRESSURE: 50 MMHG | BODY MASS INDEX: 20.59 KG/M2

## 2024-04-23 DIAGNOSIS — I10 ESSENTIAL HYPERTENSION: ICD-10-CM

## 2024-04-23 DIAGNOSIS — M79.18 RIGHT BUTTOCK PAIN: Primary | ICD-10-CM

## 2024-04-23 DIAGNOSIS — R41.89 COGNITIVE DECLINE: ICD-10-CM

## 2024-04-23 DIAGNOSIS — Z79.899 POLYPHARMACY: ICD-10-CM

## 2024-04-23 DIAGNOSIS — I48.0 PAROXYSMAL ATRIAL FIBRILLATION (HCC): ICD-10-CM

## 2024-04-23 PROBLEM — N18.31 CHRONIC KIDNEY DISEASE, STAGE 3A (HCC): Status: ACTIVE | Noted: 2024-04-23

## 2024-04-23 PROCEDURE — 1123F ACP DISCUSS/DSCN MKR DOCD: CPT | Performed by: INTERNAL MEDICINE

## 2024-04-23 PROCEDURE — G8420 CALC BMI NORM PARAMETERS: HCPCS | Performed by: INTERNAL MEDICINE

## 2024-04-23 PROCEDURE — 99214 OFFICE O/P EST MOD 30 MIN: CPT | Performed by: INTERNAL MEDICINE

## 2024-04-23 PROCEDURE — G8427 DOCREV CUR MEDS BY ELIG CLIN: HCPCS | Performed by: INTERNAL MEDICINE

## 2024-04-23 PROCEDURE — 1036F TOBACCO NON-USER: CPT | Performed by: INTERNAL MEDICINE

## 2024-04-23 RX ORDER — DULOXETIN HYDROCHLORIDE 20 MG/1
20 CAPSULE, DELAYED RELEASE ORAL DAILY
Qty: 90 CAPSULE | Refills: 0 | Status: SHIPPED | OUTPATIENT
Start: 2024-04-23

## 2024-04-23 RX ORDER — LOSARTAN POTASSIUM 100 MG/1
100 TABLET ORAL DAILY
COMMUNITY
Start: 2024-04-02

## 2024-04-23 RX ORDER — AMLODIPINE BESYLATE 5 MG/1
5 TABLET ORAL DAILY
Qty: 90 TABLET | Refills: 0 | Status: SHIPPED | OUTPATIENT
Start: 2024-04-23

## 2024-04-23 RX ORDER — AMLODIPINE BESYLATE 10 MG/1
10 TABLET ORAL DAILY
COMMUNITY
Start: 2024-02-27 | End: 2024-04-23 | Stop reason: SDUPTHER

## 2024-04-23 NOTE — PROGRESS NOTES
Chief Complaint   Patient presents with    6 Month Follow-Up        BP (!) 113/57 (Site: Left Upper Arm)   Pulse 59   Temp 97.5 °F (36.4 °C)   Resp 16   Ht 1.753 m (5' 9\")   Wt 63 kg (139 lb)   SpO2 95%   BMI 20.53 kg/m²      1. Have you been to the ER, urgent care clinic since your last visit?  Hospitalized since your last visit? no    2. Have you seen or consulted any other health care providers outside of the [facility] since your last visit?  Include any pap smears or colon screening.  no    Health Maintenance Due   Topic Date Due    Prostate Specific Antigen (PSA) Screening or Monitoring  Never done    Respiratory Syncytial Virus (RSV) Pregnant or age 60 yrs+ (1 - 1-dose 60+ series) Never done    DTaP/Tdap/Td vaccine (1 - Tdap) 01/02/2011    Shingles vaccine (3 of 3) 10/28/2019    COVID-19 Vaccine (4 - 2023-24 season) 09/01/2023    Annual Wellness Visit (Medicare)  11/27/2023             No data to display                 Failed to redirect to the Timeline version of the [employer] SmartLink.    Failed to redirect to the Timeline version of the [employer] Smart[employer].       \"Have you been to the ER, urgent care clinic since your last visit?  Hospitalized since your last visit?\"     no    “Have you seen or consulted any other health care providers outside of [facility] since your last visit?”     no            Click Here for Release of Records Request

## 2024-04-23 NOTE — ASSESSMENT & PLAN NOTE
At goal, continue current medications      Lab Results   Component Value Date    CREATININE 0.74 05/25/2023    BUN 14 05/25/2023     05/25/2023    K 4.2 05/25/2023     (H) 05/25/2023    CO2 29 05/25/2023

## 2024-04-23 NOTE — PROGRESS NOTES
Chief Complaint   Patient presents with    6 Month Follow-Up     9/15  had another laminectomy  Mr. Cox turned 89 in January in late September he had another laminectomy with [facility] is got psoriatic arthritis and follows with one of the rheumatology advanced practice clinicians he is on a significant amount of meds they check his labs frequently for some reason they increased his dose of Cymbalta up to 40 mg a day he has been more fatigued more lightheaded more weaker about 9 days ago he sat hard on his toilet he did not fall but he hit hard enough that he felt the pain in her right buttock area and has been having pain radiating into the right thigh ever since he thinks is getting better he is walks with a with a walker sometimes and walks independently other times his wife notes that he he is not as active as he was was his wife notes that he is cognitively a little slower than he was his hearing is fair his appetite is pretty good I note that he is taking 10 mg of amlodipine and 100 mg of losartan his blood pressure in the office today here is 99/50 and repeat by nursing was only slightly higher    He has not been hallucinating he has not had paranoid ideation he has just had some slowness and cognitively is not as sharp as he once was he is only using Tylenol for pain    HISTORY OF PRESENT ILLNESS:  Approximately 2 years ago he underwent L3 laminectomy and did excellent with near complete resolution of his symptoms.  He did well up until 2 months ago when he developed sudden severe pain radiating through the right buttock into his anterior right thigh.  This typically terminates at about the knee.  He has noted some subjective weakness and has had several near falls as a result typically when trying to get out of bed.  Fortunately he has not fallen.  He denies any left-sided symptoms.  He is currently enrolled in physical therapy and has had to transforaminal injections performed by

## 2024-05-23 ENCOUNTER — OFFICE VISIT (OUTPATIENT)
Age: 89
End: 2024-05-23
Payer: MEDICARE

## 2024-05-23 VITALS
WEIGHT: 136.8 LBS | BODY MASS INDEX: 20.2 KG/M2 | TEMPERATURE: 98.2 F | SYSTOLIC BLOOD PRESSURE: 127 MMHG | HEART RATE: 61 BPM | DIASTOLIC BLOOD PRESSURE: 63 MMHG | OXYGEN SATURATION: 92 % | RESPIRATION RATE: 14 BRPM

## 2024-05-23 DIAGNOSIS — Z79.899 POLYPHARMACY: ICD-10-CM

## 2024-05-23 DIAGNOSIS — I10 ESSENTIAL HYPERTENSION: Primary | ICD-10-CM

## 2024-05-23 PROCEDURE — G8420 CALC BMI NORM PARAMETERS: HCPCS | Performed by: INTERNAL MEDICINE

## 2024-05-23 PROCEDURE — 99213 OFFICE O/P EST LOW 20 MIN: CPT | Performed by: INTERNAL MEDICINE

## 2024-05-23 PROCEDURE — 1036F TOBACCO NON-USER: CPT | Performed by: INTERNAL MEDICINE

## 2024-05-23 PROCEDURE — 1123F ACP DISCUSS/DSCN MKR DOCD: CPT | Performed by: INTERNAL MEDICINE

## 2024-05-23 PROCEDURE — G8427 DOCREV CUR MEDS BY ELIG CLIN: HCPCS | Performed by: INTERNAL MEDICINE

## 2024-05-23 ASSESSMENT — PATIENT HEALTH QUESTIONNAIRE - PHQ9
SUM OF ALL RESPONSES TO PHQ9 QUESTIONS 1 & 2: 3
1. LITTLE INTEREST OR PLEASURE IN DOING THINGS: NEARLY EVERY DAY
4. FEELING TIRED OR HAVING LITTLE ENERGY: NEARLY EVERY DAY
6. FEELING BAD ABOUT YOURSELF - OR THAT YOU ARE A FAILURE OR HAVE LET YOURSELF OR YOUR FAMILY DOWN: NOT AT ALL
2. FEELING DOWN, DEPRESSED OR HOPELESS: NOT AT ALL
SUM OF ALL RESPONSES TO PHQ QUESTIONS 1-9: 6
8. MOVING OR SPEAKING SO SLOWLY THAT OTHER PEOPLE COULD HAVE NOTICED. OR THE OPPOSITE, BEING SO FIGETY OR RESTLESS THAT YOU HAVE BEEN MOVING AROUND A LOT MORE THAN USUAL: NOT AT ALL
7. TROUBLE CONCENTRATING ON THINGS, SUCH AS READING THE NEWSPAPER OR WATCHING TELEVISION: NOT AT ALL
5. POOR APPETITE OR OVEREATING: NOT AT ALL
SUM OF ALL RESPONSES TO PHQ QUESTIONS 1-9: 6
9. THOUGHTS THAT YOU WOULD BE BETTER OFF DEAD, OR OF HURTING YOURSELF: NOT AT ALL
10. IF YOU CHECKED OFF ANY PROBLEMS, HOW DIFFICULT HAVE THESE PROBLEMS MADE IT FOR YOU TO DO YOUR WORK, TAKE CARE OF THINGS AT HOME, OR GET ALONG WITH OTHER PEOPLE: NOT DIFFICULT AT ALL
3. TROUBLE FALLING OR STAYING ASLEEP: NOT AT ALL

## 2024-05-24 NOTE — PROGRESS NOTES
Chief Complaint   Patient presents with    Follow-up     Checking on the reduction of 2 medications:  amlodipine and cymbalta.     Wants to address BP the readings are variable on their home machine.           \"Have you been to the ER, urgent care clinic since your last visit?  Hospitalized since your last visit?\"    NO    “Have you seen or consulted any other health care providers outside of [facility] since your last visit?”    NO            Click Here for Release of Records Request   
  TempSrc: Temporal    SpO2: 92%    Weight: 62.1 kg (136 lb 12.8 oz)      Wt Readings from Last 3 Encounters:   05/23/24 62.1 kg (136 lb 12.8 oz)   04/23/24 63 kg (139 lb)   05/25/23 61.7 kg (136 lb)     Stable status on multiple high risk medications for multiple comorbidities, will not change any of the present treatment plans  Carter was seen today for follow-up.    Diagnoses and all orders for this visit:    Essential hypertension    Polypharmacy      Stay same for now

## 2024-07-22 RX ORDER — AMLODIPINE BESYLATE 5 MG/1
5 TABLET ORAL DAILY
Qty: 90 TABLET | Refills: 0 | Status: SHIPPED | OUTPATIENT
Start: 2024-07-22

## 2024-07-22 RX ORDER — DULOXETIN HYDROCHLORIDE 20 MG/1
CAPSULE, DELAYED RELEASE ORAL DAILY
Qty: 90 CAPSULE | Refills: 0 | Status: SHIPPED | OUTPATIENT
Start: 2024-07-22

## 2024-07-22 NOTE — TELEPHONE ENCOUNTER
Refill request received from [facility]    for   Requested Prescriptions     Pending Prescriptions Disp Refills    DULoxetine (CYMBALTA) 20 MG extended release capsule [Pharmacy Med Name: DULOXETINE HCL DR 20 MG CAP] 90 capsule 0     Sig: TAKE 1 CAPSULE BY MOUTH EVERY DAY    amLODIPine (NORVASC) 5 MG tablet [Pharmacy Med Name: AMLODIPINE BESYLATE 5 MG TAB] 90 tablet 0     Sig: TAKE 1 TABLET BY MOUTH EVERY DAY     Last office visit: 5/23/2024   Next office visit: 11/26/2024     Routed to Dr Luís Dejesus for review.     Kori Severino LPN